# Patient Record
Sex: FEMALE | Race: WHITE | NOT HISPANIC OR LATINO
[De-identification: names, ages, dates, MRNs, and addresses within clinical notes are randomized per-mention and may not be internally consistent; named-entity substitution may affect disease eponyms.]

---

## 2018-03-23 ENCOUNTER — LAB SERVICES (OUTPATIENT)
Dept: OTHER | Age: 28
End: 2018-03-23

## 2018-03-23 ENCOUNTER — CHARTING TRANS (OUTPATIENT)
Dept: OTHER | Age: 28
End: 2018-03-23

## 2018-03-23 LAB — RAPID STREP GROUP A: NORMAL

## 2018-04-02 LAB — CULTURE STREP GRP A (STTH) HL: NORMAL

## 2018-11-01 VITALS
DIASTOLIC BLOOD PRESSURE: 64 MMHG | HEIGHT: 64 IN | BODY MASS INDEX: 23.22 KG/M2 | HEART RATE: 110 BPM | RESPIRATION RATE: 18 BRPM | OXYGEN SATURATION: 98 % | SYSTOLIC BLOOD PRESSURE: 90 MMHG | TEMPERATURE: 98 F | WEIGHT: 136 LBS

## 2019-06-03 ENCOUNTER — OFFICE VISIT (OUTPATIENT)
Dept: OBGYN CLINIC | Facility: CLINIC | Age: 29
End: 2019-06-03
Payer: COMMERCIAL

## 2019-06-03 VITALS
BODY MASS INDEX: 25.9 KG/M2 | WEIGHT: 146.19 LBS | HEART RATE: 84 BPM | SYSTOLIC BLOOD PRESSURE: 110 MMHG | DIASTOLIC BLOOD PRESSURE: 72 MMHG | HEIGHT: 63 IN

## 2019-06-03 DIAGNOSIS — Z12.4 CERVICAL CANCER SCREENING: ICD-10-CM

## 2019-06-03 DIAGNOSIS — Z31.69 ENCOUNTER FOR PRECONCEPTION CONSULTATION: ICD-10-CM

## 2019-06-03 DIAGNOSIS — Z01.419 WELL FEMALE EXAM WITH ROUTINE GYNECOLOGICAL EXAM: Primary | ICD-10-CM

## 2019-06-03 PROCEDURE — 88175 CYTOPATH C/V AUTO FLUID REDO: CPT | Performed by: OBSTETRICS & GYNECOLOGY

## 2019-06-03 PROCEDURE — 99385 PREV VISIT NEW AGE 18-39: CPT | Performed by: OBSTETRICS & GYNECOLOGY

## 2019-06-03 NOTE — PATIENT INSTRUCTIONS
Preparing for Pregnancy  Even before you become pregnant, your health matters to your future baby. Adopt good health habits today. And take care of any medical problems you have before becoming pregnant.   Remember: As soon as you know you are pregnant, g © 0747-6769 [employer] [address]. [address] [facility] [place] [facility] [place]. All rights reserved. This information is not intended as a substitute for professional medical care. Always follow your healthcare professional's instructions.

## 2019-06-03 NOTE — PROGRESS NOTES
GYN H&P     6/3/2019  4:04 PM    CC: Patient is here for annual, attempting pregnancy    HPI: patient is a 33year old  here for her annual gyn exam.   She has no complaints.  Menses are regular 28-30 days Denies any pelvic pain or irregular vaginal Substance and Sexual Activity      Alcohol use: Yes        Frequency: Monthly or less        Comment: socially      Drug use: Never      Sexual activity: Yes        Partners: Male    Lifestyle      Physical activity:        Days per week: Not on file bleeding or bruising, denies dizziness, lightheadedness.    Breast: denies rashes, skin changes, pain, lumps or discharge   Psychiatric: denies depression, changes in sleep patterns, anxiety  Endocrine: denies hot or cold intolerance, mood changes,   Neurol THINPREP PAP WITH HPV REFLEX REQUEST B; Future    3.  Encounter for preconception consultation  Info given, + pnv      Jack Alexander MD

## 2019-06-06 ENCOUNTER — OFFICE VISIT (OUTPATIENT)
Dept: FAMILY MEDICINE CLINIC | Facility: CLINIC | Age: 29
End: 2019-06-06
Payer: COMMERCIAL

## 2019-06-06 VITALS
BODY MASS INDEX: 26.05 KG/M2 | HEART RATE: 76 BPM | WEIGHT: 147 LBS | HEIGHT: 63 IN | DIASTOLIC BLOOD PRESSURE: 68 MMHG | SYSTOLIC BLOOD PRESSURE: 112 MMHG

## 2019-06-06 DIAGNOSIS — Z00.00 ROUTINE GENERAL MEDICAL EXAMINATION AT A HEALTH CARE FACILITY: Primary | ICD-10-CM

## 2019-06-06 PROCEDURE — 99385 PREV VISIT NEW AGE 18-39: CPT | Performed by: FAMILY MEDICINE

## 2019-06-06 NOTE — PROGRESS NOTES
HPI:   Salbador Gosselin is a 33year old female     New patient. Patient accompanied by her  who is pleasant and supportive. No complaints.       Wt Readings from Last 6 Encounters:  06/06/19 : 147 lb  06/03/19 : 146 lb 3.2 oz    Body mass index excessive sweating. LYMPHATICS: denies swollen glands      EXAM:   /68 (BP Location: Left arm, Patient Position: Sitting, Cuff Size: adult)   Pulse 76   Ht 63\"   Wt 147 lb   LMP 05/20/2019   BMI 26.04 kg/m²   Body mass index is 26.04 kg/m².    [facility] Visit and as needed or indicated. Self breast exam explained and advised to perform once a month. Health maintenance guidance provided including vision and dental exams.    Patient counseled on age appropriate calcium and vitamin D intak

## 2019-06-15 ENCOUNTER — LAB ENCOUNTER (OUTPATIENT)
Dept: LAB | Age: 29
End: 2019-06-15
Attending: FAMILY MEDICINE
Payer: COMMERCIAL

## 2019-06-15 DIAGNOSIS — Z00.00 ROUTINE GENERAL MEDICAL EXAMINATION AT A HEALTH CARE FACILITY: ICD-10-CM

## 2019-06-15 PROCEDURE — 84439 ASSAY OF FREE THYROXINE: CPT | Performed by: FAMILY MEDICINE

## 2019-06-15 PROCEDURE — 80050 GENERAL HEALTH PANEL: CPT | Performed by: FAMILY MEDICINE

## 2019-06-15 PROCEDURE — 36415 COLL VENOUS BLD VENIPUNCTURE: CPT | Performed by: FAMILY MEDICINE

## 2019-06-15 PROCEDURE — 80061 LIPID PANEL: CPT | Performed by: FAMILY MEDICINE

## 2019-07-02 ENCOUNTER — TELEPHONE (OUTPATIENT)
Dept: FAMILY MEDICINE CLINIC | Facility: CLINIC | Age: 29
End: 2019-07-02

## 2019-07-02 NOTE — TELEPHONE ENCOUNTER
Spoke with patient and informed her of lab results and recommendations. Pt verbalized understanding and had no questions or concerns at this time.

## 2019-07-02 NOTE — TELEPHONE ENCOUNTER
----- Message from Octaviano Limon DO sent at 7/1/2019 10:50 PM CDT -----  Please call patient: LDL, AKA bad cholesterol, mildly elevated. Recommend cut down on intake of meat and increase exercise if able.   The blood test results are normal.

## 2019-07-05 ENCOUNTER — TELEPHONE (OUTPATIENT)
Dept: OBGYN CLINIC | Facility: CLINIC | Age: 29
End: 2019-07-05

## 2019-07-05 RX ORDER — NITROFURANTOIN 25; 75 MG/1; MG/1
100 CAPSULE ORAL 2 TIMES DAILY
Qty: 10 CAPSULE | Refills: 0 | Status: SHIPPED | OUTPATIENT
Start: 2019-07-05 | End: 2019-07-10

## 2019-07-05 NOTE — TELEPHONE ENCOUNTER
32 y/o called c/o UTI symptoms. States she has burning, frequency, and pain with urination. Denies any hematuria or fever. Last OV date: 06/03/19  Recent Test/Labs: NA   Recommendations: Macrobid sent to pharmacy per protocol.  Advised patient to call yudith

## 2019-08-16 ENCOUNTER — OFFICE VISIT (OUTPATIENT)
Dept: FAMILY MEDICINE CLINIC | Facility: CLINIC | Age: 29
End: 2019-08-16
Payer: COMMERCIAL

## 2019-08-16 VITALS
WEIGHT: 147 LBS | HEIGHT: 63 IN | BODY MASS INDEX: 26.05 KG/M2 | DIASTOLIC BLOOD PRESSURE: 62 MMHG | SYSTOLIC BLOOD PRESSURE: 100 MMHG | HEART RATE: 70 BPM

## 2019-08-16 DIAGNOSIS — Z88.9 MULTIPLE DRUG ALLERGIES: ICD-10-CM

## 2019-08-16 DIAGNOSIS — L03.031 PARONYCHIA OF GREAT TOE, RIGHT: Primary | ICD-10-CM

## 2019-08-16 PROCEDURE — 99214 OFFICE O/P EST MOD 30 MIN: CPT | Performed by: FAMILY MEDICINE

## 2019-08-16 NOTE — PATIENT INSTRUCTIONS
-Recommend warm soaks with [place] twice a day. -Apply OTC triple antibiotic topically 2-3 times a day. -Try to avoid taking the oral antibiotic. Only take the antibiotic if the toe is worsening.           Paronychia of the Finger or To these occur:  · Redness, pain, or swelling of the finger or toe gets worse  · Red streaks in the skin leading away from the wound  · Pus or fluid draining from the nail area  · Fever of 100.4ºF (38ºC) or higher, or as directed by your provider  Date Last R

## 2019-08-16 NOTE — PROGRESS NOTES
Anselmo Atkinson is a 30year old female. HPI:     Roomer's notes read and reviewed with patient. Swelling pain and redness of right first toe. No recall of injury. 4 days. Was getting better, now getting worse. Right foot 1st toe.   Not associat thought content.         DATA:    Results for orders placed or performed in visit on 06/15/19   LIPID PANEL    Collection Time: 06/15/19  9:02 AM   Result Value Ref Range    Cholesterol, Total 194 <200 mg/dL    HDL Cholesterol 47 40 - 59 mg/dL    Triglyceri Absolute 0.03 0.00 - 0.20 x10(3) uL    Immature Granulocyte Absolute 0.02 0.00 - 1.00 x10(3) uL    Neutrophil % 58.0 %    Lymphocyte % 27.0 %    Monocyte % 8.8 %    Eosinophil % 5.4 %    Basophil % 0.5 %    Immature Granulocyte % 0.3 %           ASSESSMENT

## 2019-12-26 ENCOUNTER — TELEPHONE (OUTPATIENT)
Dept: OBGYN CLINIC | Facility: CLINIC | Age: 29
End: 2019-12-26

## 2019-12-26 NOTE — TELEPHONE ENCOUNTER
Received staff message from Dr. Cynthia Hackett stating that patient contacted on call with possible Bartholin's cyst symptoms.  He requested that the nursing staff f/u with patient this AM to assess whether appt is needed/symptoms are persisting after sitz baths and

## 2019-12-27 ENCOUNTER — OFFICE VISIT (OUTPATIENT)
Dept: OBGYN CLINIC | Facility: CLINIC | Age: 29
End: 2019-12-27
Payer: COMMERCIAL

## 2019-12-27 VITALS — WEIGHT: 149.63 LBS | BODY MASS INDEX: 27 KG/M2 | DIASTOLIC BLOOD PRESSURE: 80 MMHG | SYSTOLIC BLOOD PRESSURE: 116 MMHG

## 2019-12-27 DIAGNOSIS — N76.4 VULVAR ABSCESS: Primary | ICD-10-CM

## 2019-12-27 PROCEDURE — 87077 CULTURE AEROBIC IDENTIFY: CPT | Performed by: OBSTETRICS & GYNECOLOGY

## 2019-12-27 PROCEDURE — 87205 SMEAR GRAM STAIN: CPT | Performed by: OBSTETRICS & GYNECOLOGY

## 2019-12-27 PROCEDURE — 87186 SC STD MICRODIL/AGAR DIL: CPT | Performed by: OBSTETRICS & GYNECOLOGY

## 2019-12-27 PROCEDURE — 87070 CULTURE OTHR SPECIMN AEROBIC: CPT | Performed by: OBSTETRICS & GYNECOLOGY

## 2019-12-27 PROCEDURE — 56405 I&D VULVA/PERINEAL ABSCESS: CPT | Performed by: OBSTETRICS & GYNECOLOGY

## 2019-12-27 RX ORDER — SULFAMETHOXAZOLE AND TRIMETHOPRIM 800; 160 MG/1; MG/1
1 TABLET ORAL 2 TIMES DAILY
Qty: 14 TABLET | Refills: 0 | Status: SHIPPED | OUTPATIENT
Start: 2019-12-27 | End: 2020-01-03

## 2019-12-27 NOTE — PATIENT INSTRUCTIONS
Take bactrim twice daily x 7 days  Sit in warm bath as soon as you get home and twice daily  Continue ibupofen every 6 hours  Call if any fever>100.5  Or if pain is worsening

## 2019-12-27 NOTE — PROGRESS NOTES
GYN H&P     2019  10:35 AM    CC: Patient is here for vulvar abscess    HPI: patient is a 30year old  here for vulvar abscess. Started 4 days ago. Has been doing baking soda baths. This happened a few years ago as well, treated with abx.  Very A/P: Patient is 30year old female here for   1.  Vulvar abscess        Patient Active Problem List:     Multiple drug allergies     Paronychia of great toe, right           1. Vulvar abscess  -Bactrim BID x 7 days  -Sitz baths BID  -Ibuprofen scheduled  -C

## 2019-12-28 ENCOUNTER — TELEPHONE (OUTPATIENT)
Dept: OBGYN CLINIC | Facility: CLINIC | Age: 29
End: 2019-12-28

## 2019-12-28 NOTE — TELEPHONE ENCOUNTER
Lab called to cancel test. Doctor Kevin Maldonadolyric only had one swab for 2 test. Lab stated that it should have been a red and blue top for patients test swab.

## 2019-12-30 NOTE — PROGRESS NOTES
Results reviewed. Please inform patienther swab was MSSA, but sensitive to bactrim so she should complete that course. Thanks!

## 2019-12-30 NOTE — PROGRESS NOTES
Patient returned call. Reported results and recommendations for continuing medication that she is currently on. She has f/u appt in office in 1/2/20; advised to keep appt and to call if any new or worsening symptoms. Patient states understanding.

## 2020-01-02 ENCOUNTER — OFFICE VISIT (OUTPATIENT)
Dept: OBGYN CLINIC | Facility: CLINIC | Age: 30
End: 2020-01-02
Payer: COMMERCIAL

## 2020-01-02 VITALS
BODY MASS INDEX: 26.58 KG/M2 | SYSTOLIC BLOOD PRESSURE: 106 MMHG | WEIGHT: 150 LBS | DIASTOLIC BLOOD PRESSURE: 70 MMHG | HEIGHT: 63 IN

## 2020-01-02 DIAGNOSIS — N76.4 VULVAR ABSCESS: Primary | ICD-10-CM

## 2020-01-02 PROCEDURE — 99024 POSTOP FOLLOW-UP VISIT: CPT | Performed by: OBSTETRICS & GYNECOLOGY

## 2020-01-02 NOTE — PROGRESS NOTES
GYN H&P     2020  11:22 AM    CC: Patient is here for f/u ov vulvar abscess    HPI: patient is a 30year old  here for f/u of vulvar abscess. Had abscess I&D last week in office in inguinal fold, taking bactrim, doing MUCH better.  No complaints Genitourinary: Negative for bladder incontinence, urgency, vaginal bleeding, vaginal discharge, menstrual problem, breast mass and breast pain. All other systems reviewed and are negative.       /70   Ht 63\"   Wt 150 lb (68 kg)   LMP 12/26/2019 (Ex

## 2020-02-21 ENCOUNTER — TELEPHONE (OUTPATIENT)
Dept: OBGYN CLINIC | Facility: CLINIC | Age: 30
End: 2020-02-21

## 2020-02-21 NOTE — TELEPHONE ENCOUNTER
Patient calling to initiate prenatal care  LMP 1/22  Patient is 7-8 weeks 3/26  Confirmation Ultrasound and Appointment scheduled on   Future Appointments   Date Time Provider Fletcher Floyd   3/26/2020  3:30 PM [facility] NURSE [facility] P [facility]

## 2020-02-25 NOTE — TELEPHONE ENCOUNTER
; taking PNV, no other meds. Pt feeling well, mild cramping at times; no bleeding. Pt asked about taking Benadryl; needs to pre medicate before going to friends house with cats; cat allergy. Pt advised OK to take.   Pt advised to call with any spotti

## 2020-03-20 ENCOUNTER — TELEPHONE (OUTPATIENT)
Dept: OBGYN CLINIC | Facility: CLINIC | Age: 30
End: 2020-03-20

## 2020-03-20 NOTE — TELEPHONE ENCOUNTER
Patient given list of safe fish to eat in pregnancy. Also sent to her via my chart. Advised patient she will receive handout at first appointment. She verbalized understanding. No further questions or concerns.

## 2020-03-25 ENCOUNTER — ULTRASOUND ENCOUNTER (OUTPATIENT)
Dept: OBGYN CLINIC | Facility: CLINIC | Age: 30
End: 2020-03-25
Payer: COMMERCIAL

## 2020-03-25 ENCOUNTER — OFFICE VISIT (OUTPATIENT)
Dept: OBGYN CLINIC | Facility: CLINIC | Age: 30
End: 2020-03-25
Payer: COMMERCIAL

## 2020-03-25 VITALS
WEIGHT: 153.19 LBS | HEIGHT: 63 IN | SYSTOLIC BLOOD PRESSURE: 128 MMHG | BODY MASS INDEX: 27.14 KG/M2 | DIASTOLIC BLOOD PRESSURE: 80 MMHG

## 2020-03-25 DIAGNOSIS — Z32.01 PREGNANCY EXAMINATION OR TEST, POSITIVE RESULT: ICD-10-CM

## 2020-03-25 DIAGNOSIS — N91.1 SECONDARY AMENORRHEA: Primary | ICD-10-CM

## 2020-03-25 PROCEDURE — 99213 OFFICE O/P EST LOW 20 MIN: CPT | Performed by: OBSTETRICS & GYNECOLOGY

## 2020-03-25 PROCEDURE — 76856 US EXAM PELVIC COMPLETE: CPT | Performed by: OBSTETRICS & GYNECOLOGY

## 2020-03-25 NOTE — PROGRESS NOTES
[facility]  Obstetrics and Gynecology    Subjective:     Kezia Lorenzo is a 30year old  female presents with c/o secondary amenorrhea and positive pregnancy test. The patient was recommended to return for further evaluation and a pelv

## 2020-04-14 ENCOUNTER — INITIAL PRENATAL (OUTPATIENT)
Dept: OBGYN CLINIC | Facility: CLINIC | Age: 30
End: 2020-04-14
Payer: COMMERCIAL

## 2020-04-14 VITALS
BODY MASS INDEX: 27.11 KG/M2 | WEIGHT: 153 LBS | DIASTOLIC BLOOD PRESSURE: 68 MMHG | SYSTOLIC BLOOD PRESSURE: 108 MMHG | HEIGHT: 63 IN

## 2020-04-14 DIAGNOSIS — Z36.9 PRENATAL SCREENING ENCOUNTER: Primary | ICD-10-CM

## 2020-04-14 DIAGNOSIS — Z34.01 ENCOUNTER FOR SUPERVISION OF NORMAL FIRST PREGNANCY IN FIRST TRIMESTER: ICD-10-CM

## 2020-04-14 PROBLEM — L03.031 PARONYCHIA OF GREAT TOE, RIGHT: Status: RESOLVED | Noted: 2019-08-16 | Resolved: 2020-04-14

## 2020-04-14 PROBLEM — N76.4 VULVAR ABSCESS: Status: RESOLVED | Noted: 2020-01-02 | Resolved: 2020-04-14

## 2020-04-14 PROCEDURE — 86850 RBC ANTIBODY SCREEN: CPT | Performed by: OBSTETRICS & GYNECOLOGY

## 2020-04-14 PROCEDURE — 87389 HIV-1 AG W/HIV-1&-2 AB AG IA: CPT | Performed by: OBSTETRICS & GYNECOLOGY

## 2020-04-14 PROCEDURE — 87340 HEPATITIS B SURFACE AG IA: CPT | Performed by: OBSTETRICS & GYNECOLOGY

## 2020-04-14 PROCEDURE — 85025 COMPLETE CBC W/AUTO DIFF WBC: CPT | Performed by: OBSTETRICS & GYNECOLOGY

## 2020-04-14 PROCEDURE — 87491 CHLMYD TRACH DNA AMP PROBE: CPT | Performed by: OBSTETRICS & GYNECOLOGY

## 2020-04-14 PROCEDURE — 86900 BLOOD TYPING SEROLOGIC ABO: CPT | Performed by: OBSTETRICS & GYNECOLOGY

## 2020-04-14 PROCEDURE — 86780 TREPONEMA PALLIDUM: CPT | Performed by: OBSTETRICS & GYNECOLOGY

## 2020-04-14 PROCEDURE — 87591 N.GONORRHOEAE DNA AMP PROB: CPT | Performed by: OBSTETRICS & GYNECOLOGY

## 2020-04-14 PROCEDURE — 87086 URINE CULTURE/COLONY COUNT: CPT | Performed by: OBSTETRICS & GYNECOLOGY

## 2020-04-14 PROCEDURE — 81002 URINALYSIS NONAUTO W/O SCOPE: CPT | Performed by: OBSTETRICS & GYNECOLOGY

## 2020-04-14 PROCEDURE — 86762 RUBELLA ANTIBODY: CPT | Performed by: OBSTETRICS & GYNECOLOGY

## 2020-04-14 PROCEDURE — 86901 BLOOD TYPING SEROLOGIC RH(D): CPT | Performed by: OBSTETRICS & GYNECOLOGY

## 2020-04-14 RX ORDER — GLYCERIN/MINERAL OIL
LOTION (ML) TOPICAL DAILY
COMMUNITY

## 2020-04-14 NOTE — PROGRESS NOTES
Here for initial prenatal visit with our group. 30year old  at 11w10d. Feeling well. No vaginal bleeding.  Works as a  (home for now)    PMH: denies  PSH: denies  [address]: cousin with autism  Allergies: Augmentin, Zpack, s there is no testing to predict autism  6. We reviewed today, dietary restrictions, exercise during pregnancy, and goal weight gain based upon her BMI of 27. We reviewed avoidance of exposures to cigarettes, alcohol, and drugs.      Follow up visit in 4 wee

## 2020-04-14 NOTE — PATIENT INSTRUCTIONS
During pregnancy, here are some general recommendations:  1.  Prenatal Vitamins: Pregnant women should consume the following each day through diet or supplements:  o Folic acid 201–484 micrograms   o Iron 30 mg (or be screened for anemia)  o Vitamin D 600 i 13. Oral health and routine dental procedures should continue as scheduled during pregnancy. These include cleanings, extraction, scaling, root          canal, radiographs (assuming the abdomen and thyroid are shielded), and restoration and fillings  14.  P - Performed at greater than or equal to 10 weeks and includes blood test   - Screens for Trisomy 13, 18 and 21 (Down Syndrome)   - Please verify insurance coverage:   CPT code: 99736  Diagnosis code: Genetic screening - Z36.8A     *Please also consider:  M

## 2020-04-15 PROBLEM — Z78.9 NOT IMMUNE TO RUBELLA: Status: ACTIVE | Noted: 2020-04-15

## 2020-04-17 ENCOUNTER — TELEPHONE (OUTPATIENT)
Dept: PERINATAL CARE | Facility: HOSPITAL | Age: 30
End: 2020-04-17

## 2020-05-11 ENCOUNTER — ROUTINE PRENATAL (OUTPATIENT)
Dept: OBGYN CLINIC | Facility: CLINIC | Age: 30
End: 2020-05-11
Payer: COMMERCIAL

## 2020-05-11 VITALS
BODY MASS INDEX: 28 KG/M2 | DIASTOLIC BLOOD PRESSURE: 78 MMHG | TEMPERATURE: 99 F | SYSTOLIC BLOOD PRESSURE: 132 MMHG | WEIGHT: 159 LBS

## 2020-05-11 DIAGNOSIS — Z78.9 NOT IMMUNE TO RUBELLA: ICD-10-CM

## 2020-05-11 DIAGNOSIS — Z13.79 GENETIC SCREENING: ICD-10-CM

## 2020-05-11 DIAGNOSIS — Z34.92 ENCOUNTER FOR SUPERVISION OF NORMAL PREGNANCY IN SECOND TRIMESTER, UNSPECIFIED GRAVIDITY: Primary | ICD-10-CM

## 2020-05-11 PROCEDURE — 81002 URINALYSIS NONAUTO W/O SCOPE: CPT | Performed by: OBSTETRICS & GYNECOLOGY

## 2020-05-11 NOTE — PROGRESS NOTES
MICHAEL.   Doing well. No complaints. Denies abdominal/pelvic pain or vaginal bleeding. Rh positive   Genetic screening desires Quad screen. Order provided. Advised to complete at 16-20 weeks.     Recommend Anatomy scan 20 wks   Prenatal labs reviewed   D/w

## 2020-05-19 ENCOUNTER — LAB ENCOUNTER (OUTPATIENT)
Dept: LAB | Age: 30
End: 2020-05-19
Attending: OBSTETRICS & GYNECOLOGY
Payer: COMMERCIAL

## 2020-05-19 DIAGNOSIS — Z13.79 GENETIC SCREENING: ICD-10-CM

## 2020-05-19 DIAGNOSIS — Z34.92 ENCOUNTER FOR SUPERVISION OF NORMAL PREGNANCY IN SECOND TRIMESTER, UNSPECIFIED GRAVIDITY: ICD-10-CM

## 2020-05-19 PROCEDURE — 81511 FTL CGEN ABNOR FOUR ANAL: CPT

## 2020-05-19 PROCEDURE — 36415 COLL VENOUS BLD VENIPUNCTURE: CPT

## 2020-06-01 ENCOUNTER — PATIENT MESSAGE (OUTPATIENT)
Dept: OBGYN CLINIC | Facility: CLINIC | Age: 30
End: 2020-06-01

## 2020-06-01 NOTE — TELEPHONE ENCOUNTER
Pt called checking status of [employer] message sent this morning.     Please call
Spoke to patient who is 18W5D. She stated she had chest pain last night around 3 AM. It was only on left side. She denies any SOB. Denies any OB concerns. She stated she did have indigestion the past 2 days.  I advised patient she can try Tylenol and Tums b
Statement Selected

## 2020-06-11 ENCOUNTER — ROUTINE PRENATAL (OUTPATIENT)
Dept: OBGYN CLINIC | Facility: CLINIC | Age: 30
End: 2020-06-11
Payer: COMMERCIAL

## 2020-06-11 ENCOUNTER — ULTRASOUND ENCOUNTER (OUTPATIENT)
Dept: OBGYN CLINIC | Facility: CLINIC | Age: 30
End: 2020-06-11
Payer: COMMERCIAL

## 2020-06-11 VITALS
WEIGHT: 164 LBS | DIASTOLIC BLOOD PRESSURE: 74 MMHG | SYSTOLIC BLOOD PRESSURE: 114 MMHG | TEMPERATURE: 98 F | BODY MASS INDEX: 29 KG/M2

## 2020-06-11 DIAGNOSIS — Z36.89 ENCOUNTER FOR FETAL ANATOMIC SURVEY: ICD-10-CM

## 2020-06-11 DIAGNOSIS — Z36.9 PRENATAL SCREENING ENCOUNTER: ICD-10-CM

## 2020-06-11 DIAGNOSIS — Z3A.20 20 WEEKS GESTATION OF PREGNANCY: Primary | ICD-10-CM

## 2020-06-11 PROCEDURE — 81002 URINALYSIS NONAUTO W/O SCOPE: CPT | Performed by: OBSTETRICS & GYNECOLOGY

## 2020-06-11 PROCEDURE — 76805 OB US >/= 14 WKS SNGL FETUS: CPT | Performed by: OBSTETRICS & GYNECOLOGY

## 2020-06-11 NOTE — PROGRESS NOTES
OB ANATOMIC SURVEY    IUP at 20w 1d  Measuring 20w 1d  Efw 283g, AGA  Fhr 148  Pos: vertex  Anatomic survey normal  3 vessel cord, antplac  Nl afv, cx 3.22cm

## 2020-06-11 NOTE — PROGRESS NOTES
MICHAEL  Doing well  No complaints.  No LOF/VB/uctx  RH +  Genetic testing nl quad (first, quad/AFP)  Anatomy Scan nl today (18-20weeks)  rtc 4 wk  Gct/cbc on rtc

## 2020-07-10 ENCOUNTER — ROUTINE PRENATAL (OUTPATIENT)
Dept: OBGYN CLINIC | Facility: CLINIC | Age: 30
End: 2020-07-10
Payer: COMMERCIAL

## 2020-07-10 VITALS — BODY MASS INDEX: 30 KG/M2 | DIASTOLIC BLOOD PRESSURE: 76 MMHG | WEIGHT: 170 LBS | SYSTOLIC BLOOD PRESSURE: 124 MMHG

## 2020-07-10 DIAGNOSIS — Z34.02 ENCOUNTER FOR SUPERVISION OF NORMAL FIRST PREGNANCY IN SECOND TRIMESTER: ICD-10-CM

## 2020-07-10 DIAGNOSIS — Z78.9 NOT IMMUNE TO RUBELLA: ICD-10-CM

## 2020-07-10 DIAGNOSIS — Z36.9 PRENATAL SCREENING ENCOUNTER: Primary | ICD-10-CM

## 2020-07-10 LAB — MULTISTIX LOT#: NORMAL NUMERIC

## 2020-07-10 PROCEDURE — 81002 URINALYSIS NONAUTO W/O SCOPE: CPT | Performed by: OBSTETRICS & GYNECOLOGY

## 2020-07-12 NOTE — PROGRESS NOTES
MICHAEL--24w4d    Doing ok. 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 Is concerned the buildup of CO2 is going to harm the baby. Does not want to wear a mask all day.   Denies Anderson L

## 2020-07-13 ENCOUNTER — LAB ENCOUNTER (OUTPATIENT)
Dept: LAB | Age: 30
End: 2020-07-13
Attending: OBSTETRICS & GYNECOLOGY
Payer: COMMERCIAL

## 2020-07-13 DIAGNOSIS — Z3A.20 20 WEEKS GESTATION OF PREGNANCY: ICD-10-CM

## 2020-07-13 DIAGNOSIS — Z36.9 PRENATAL SCREENING ENCOUNTER: ICD-10-CM

## 2020-07-13 LAB
BASOPHILS # BLD AUTO: 0.08 X10(3) UL (ref 0–0.2)
BASOPHILS NFR BLD AUTO: 0.8 %
DEPRECATED RDW RBC AUTO: 45.5 FL (ref 35.1–46.3)
EOSINOPHIL # BLD AUTO: 0.42 X10(3) UL (ref 0–0.7)
EOSINOPHIL NFR BLD AUTO: 4.2 %
ERYTHROCYTE [DISTWIDTH] IN BLOOD BY AUTOMATED COUNT: 13.3 % (ref 11–15)
GLUCOSE 1H P GLC SERPL-MCNC: 121 MG/DL
HCT VFR BLD AUTO: 33.5 % (ref 35–48)
HGB BLD-MCNC: 11.1 G/DL (ref 12–16)
IMM GRANULOCYTES # BLD AUTO: 0.35 X10(3) UL (ref 0–1)
IMM GRANULOCYTES NFR BLD: 3.5 %
LYMPHOCYTES # BLD AUTO: 1.18 X10(3) UL (ref 1–4)
LYMPHOCYTES NFR BLD AUTO: 11.7 %
MCH RBC QN AUTO: 31 PG (ref 26–34)
MCHC RBC AUTO-ENTMCNC: 33.1 G/DL (ref 31–37)
MCV RBC AUTO: 93.6 FL (ref 80–100)
MONOCYTES # BLD AUTO: 0.59 X10(3) UL (ref 0.1–1)
MONOCYTES NFR BLD AUTO: 5.8 %
NEUTROPHILS # BLD AUTO: 7.49 X10 (3) UL (ref 1.5–7.7)
NEUTROPHILS # BLD AUTO: 7.49 X10(3) UL (ref 1.5–7.7)
NEUTROPHILS NFR BLD AUTO: 74 %
PLATELET # BLD AUTO: 176 10(3)UL (ref 150–450)
RBC # BLD AUTO: 3.58 X10(6)UL (ref 3.8–5.3)
WBC # BLD AUTO: 10.1 X10(3) UL (ref 4–11)

## 2020-07-13 PROCEDURE — 82950 GLUCOSE TEST: CPT | Performed by: OBSTETRICS & GYNECOLOGY

## 2020-07-13 PROCEDURE — 85025 COMPLETE CBC W/AUTO DIFF WBC: CPT | Performed by: OBSTETRICS & GYNECOLOGY

## 2020-07-13 PROCEDURE — 36415 COLL VENOUS BLD VENIPUNCTURE: CPT | Performed by: OBSTETRICS & GYNECOLOGY

## 2020-08-07 ENCOUNTER — ROUTINE PRENATAL (OUTPATIENT)
Dept: OBGYN CLINIC | Facility: CLINIC | Age: 30
End: 2020-08-07
Payer: COMMERCIAL

## 2020-08-07 VITALS
DIASTOLIC BLOOD PRESSURE: 76 MMHG | WEIGHT: 173 LBS | TEMPERATURE: 98 F | BODY MASS INDEX: 31 KG/M2 | SYSTOLIC BLOOD PRESSURE: 122 MMHG

## 2020-08-07 DIAGNOSIS — Z3A.28 28 WEEKS GESTATION OF PREGNANCY: Primary | ICD-10-CM

## 2020-08-07 DIAGNOSIS — Z23 NEED FOR VACCINATION: ICD-10-CM

## 2020-08-07 DIAGNOSIS — Z36.9 PRENATAL SCREENING ENCOUNTER: ICD-10-CM

## 2020-08-07 LAB — MULTISTIX LOT#: NORMAL NUMERIC

## 2020-08-07 PROCEDURE — 81002 URINALYSIS NONAUTO W/O SCOPE: CPT | Performed by: OBSTETRICS & GYNECOLOGY

## 2020-08-07 PROCEDURE — 3074F SYST BP LT 130 MM HG: CPT | Performed by: OBSTETRICS & GYNECOLOGY

## 2020-08-07 PROCEDURE — 90471 IMMUNIZATION ADMIN: CPT | Performed by: OBSTETRICS & GYNECOLOGY

## 2020-08-07 PROCEDURE — 90715 TDAP VACCINE 7 YRS/> IM: CPT | Performed by: OBSTETRICS & GYNECOLOGY

## 2020-08-07 PROCEDURE — 3078F DIAST BP <80 MM HG: CPT | Performed by: OBSTETRICS & GYNECOLOGY

## 2020-08-07 NOTE — PROGRESS NOTES
MICHAEL  Doing well  RH +  S/P Anatomy scan nl  1 hr glucose (24-28wks)nl  TDAP recommended during pregnancy and instructions given today  RTC 2 wks

## 2020-08-21 ENCOUNTER — TELEPHONE (OUTPATIENT)
Dept: OBGYN CLINIC | Facility: CLINIC | Age: 30
End: 2020-08-21

## 2020-08-21 ENCOUNTER — ROUTINE PRENATAL (OUTPATIENT)
Dept: OBGYN CLINIC | Facility: CLINIC | Age: 30
End: 2020-08-21
Payer: COMMERCIAL

## 2020-08-21 VITALS — WEIGHT: 177.38 LBS | SYSTOLIC BLOOD PRESSURE: 110 MMHG | DIASTOLIC BLOOD PRESSURE: 70 MMHG | BODY MASS INDEX: 31 KG/M2

## 2020-08-21 DIAGNOSIS — Z36.9 PRENATAL SCREENING ENCOUNTER: Primary | ICD-10-CM

## 2020-08-21 DIAGNOSIS — Z34.02 ENCOUNTER FOR SUPERVISION OF NORMAL FIRST PREGNANCY IN SECOND TRIMESTER: ICD-10-CM

## 2020-08-21 LAB — MULTISTIX LOT#: NORMAL NUMERIC

## 2020-08-21 PROCEDURE — 3074F SYST BP LT 130 MM HG: CPT | Performed by: OBSTETRICS & GYNECOLOGY

## 2020-08-21 PROCEDURE — 81002 URINALYSIS NONAUTO W/O SCOPE: CPT | Performed by: OBSTETRICS & GYNECOLOGY

## 2020-08-21 PROCEDURE — 3078F DIAST BP <80 MM HG: CPT | Performed by: OBSTETRICS & GYNECOLOGY

## 2020-08-21 NOTE — TELEPHONE ENCOUNTER
Pt dropped off [facility] paperwork to be completed. [facility] [address] [facility]. Pt will  when they are ready.

## 2020-08-21 NOTE — PROGRESS NOTES
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

## 2020-08-25 ENCOUNTER — MED REC SCAN ONLY (OUTPATIENT)
Dept: OBGYN CLINIC | Facility: CLINIC | Age: 30
End: 2020-08-25

## 2020-09-03 ENCOUNTER — ROUTINE PRENATAL (OUTPATIENT)
Dept: OBGYN CLINIC | Facility: CLINIC | Age: 30
End: 2020-09-03
Payer: COMMERCIAL

## 2020-09-03 VITALS — SYSTOLIC BLOOD PRESSURE: 130 MMHG | WEIGHT: 179.38 LBS | BODY MASS INDEX: 32 KG/M2 | DIASTOLIC BLOOD PRESSURE: 80 MMHG

## 2020-09-03 DIAGNOSIS — Z34.02 ENCOUNTER FOR SUPERVISION OF NORMAL FIRST PREGNANCY IN SECOND TRIMESTER: ICD-10-CM

## 2020-09-03 DIAGNOSIS — Z36.9 PRENATAL SCREENING ENCOUNTER: Primary | ICD-10-CM

## 2020-09-03 DIAGNOSIS — Z34.03 ENCOUNTER FOR SUPERVISION OF NORMAL FIRST PREGNANCY IN THIRD TRIMESTER: ICD-10-CM

## 2020-09-03 LAB — MULTISTIX LOT#: NORMAL NUMERIC

## 2020-09-03 PROCEDURE — 3079F DIAST BP 80-89 MM HG: CPT | Performed by: NURSE PRACTITIONER

## 2020-09-03 PROCEDURE — 81002 URINALYSIS NONAUTO W/O SCOPE: CPT | Performed by: NURSE PRACTITIONER

## 2020-09-03 PROCEDURE — 3075F SYST BP GE 130 - 139MM HG: CPT | Performed by: NURSE PRACTITIONER

## 2020-09-03 PROCEDURE — 87389 HIV-1 AG W/HIV-1&-2 AB AG IA: CPT | Performed by: OBSTETRICS & GYNECOLOGY

## 2020-09-19 ENCOUNTER — ROUTINE PRENATAL (OUTPATIENT)
Dept: OBGYN CLINIC | Facility: CLINIC | Age: 30
End: 2020-09-19
Payer: COMMERCIAL

## 2020-09-19 VITALS — SYSTOLIC BLOOD PRESSURE: 116 MMHG | DIASTOLIC BLOOD PRESSURE: 78 MMHG | WEIGHT: 180 LBS | BODY MASS INDEX: 32 KG/M2

## 2020-09-19 DIAGNOSIS — Z34.93 ENCOUNTER FOR SUPERVISION OF NORMAL PREGNANCY IN THIRD TRIMESTER, UNSPECIFIED GRAVIDITY: Primary | ICD-10-CM

## 2020-09-19 DIAGNOSIS — Z36.9 PRENATAL SCREENING ENCOUNTER: ICD-10-CM

## 2020-09-19 LAB
GLUCOSE (URINE DIPSTICK): NEGATIVE MG/DL
MULTISTIX LOT#: NORMAL NUMERIC
PROTEIN (URINE DIPSTICK): NEGATIVE MG/DL

## 2020-09-19 PROCEDURE — 81002 URINALYSIS NONAUTO W/O SCOPE: CPT | Performed by: OBSTETRICS & GYNECOLOGY

## 2020-09-19 PROCEDURE — 3074F SYST BP LT 130 MM HG: CPT | Performed by: OBSTETRICS & GYNECOLOGY

## 2020-09-19 PROCEDURE — 3078F DIAST BP <80 MM HG: CPT | Performed by: OBSTETRICS & GYNECOLOGY

## 2020-09-19 NOTE — PATIENT INSTRUCTIONS
FETAL MOVEMENT CHART    Although not all women need to perform daily fetal movement counts, if you notice a decrease in fetal activity, you should contact our office immediately. Begin counting fetal movements at 32 weeks of pregnancy.   You may find

## 2020-09-19 NOTE — PROGRESS NOTES
MICHAEL    GA: 34w3d   20  1156   BP: 116/78   Weight: 180 lb (81.6 kg)       Doing well, +FM  Denies LOF/VB/uctx  Rh positive, TDAP received, EPDS 0  Fetal movement count given    RTC in 2 wks    PTL and Fetal movement instructions given

## 2020-10-03 ENCOUNTER — ROUTINE PRENATAL (OUTPATIENT)
Dept: OBGYN CLINIC | Facility: CLINIC | Age: 30
End: 2020-10-03

## 2020-10-03 VITALS — BODY MASS INDEX: 32 KG/M2 | DIASTOLIC BLOOD PRESSURE: 60 MMHG | WEIGHT: 183.19 LBS | SYSTOLIC BLOOD PRESSURE: 104 MMHG

## 2020-10-03 DIAGNOSIS — Z34.03 ENCOUNTER FOR SUPERVISION OF NORMAL FIRST PREGNANCY IN THIRD TRIMESTER: Primary | ICD-10-CM

## 2020-10-03 DIAGNOSIS — Z36.9 PRENATAL SCREENING ENCOUNTER: ICD-10-CM

## 2020-10-03 PROCEDURE — 81002 URINALYSIS NONAUTO W/O SCOPE: CPT | Performed by: OBSTETRICS & GYNECOLOGY

## 2020-10-03 PROCEDURE — 3074F SYST BP LT 130 MM HG: CPT | Performed by: OBSTETRICS & GYNECOLOGY

## 2020-10-03 PROCEDURE — 87184 SC STD DISK METHOD PER PLATE: CPT | Performed by: OBSTETRICS & GYNECOLOGY

## 2020-10-03 PROCEDURE — 87653 STREP B DNA AMP PROBE: CPT | Performed by: OBSTETRICS & GYNECOLOGY

## 2020-10-03 PROCEDURE — 3078F DIAST BP <80 MM HG: CPT | Performed by: OBSTETRICS & GYNECOLOGY

## 2020-10-03 PROCEDURE — 87081 CULTURE SCREEN ONLY: CPT | Performed by: OBSTETRICS & GYNECOLOGY

## 2020-10-03 NOTE — PROGRESS NOTES
MICHAEL - 36w3d    Doing well, +FM  Denies LOF/VB/uctx  Thinking about stopping work due to leg swelling. Compression stocking advised.    /60   Wt 183 lb 3.2 oz (83.1 kg)   LMP 01/22/2020 (Exact Date)   BMI 32.45 kg/m²    GBS collected  RTC 1 week

## 2020-10-09 ENCOUNTER — ROUTINE PRENATAL (OUTPATIENT)
Dept: OBGYN CLINIC | Facility: CLINIC | Age: 30
End: 2020-10-09

## 2020-10-09 ENCOUNTER — TELEPHONE (OUTPATIENT)
Dept: OBGYN CLINIC | Facility: CLINIC | Age: 30
End: 2020-10-09

## 2020-10-09 VITALS
BODY MASS INDEX: 33 KG/M2 | SYSTOLIC BLOOD PRESSURE: 124 MMHG | WEIGHT: 184.19 LBS | TEMPERATURE: 98 F | DIASTOLIC BLOOD PRESSURE: 72 MMHG

## 2020-10-09 DIAGNOSIS — Z34.93 ENCOUNTER FOR SUPERVISION OF NORMAL PREGNANCY IN THIRD TRIMESTER, UNSPECIFIED GRAVIDITY: Primary | ICD-10-CM

## 2020-10-09 DIAGNOSIS — Z36.9 PRENATAL SCREENING ENCOUNTER: ICD-10-CM

## 2020-10-09 DIAGNOSIS — O99.820 GBS (GROUP B STREPTOCOCCUS CARRIER), +RV CULTURE, CURRENTLY PREGNANT: ICD-10-CM

## 2020-10-09 PROCEDURE — 3078F DIAST BP <80 MM HG: CPT | Performed by: OBSTETRICS & GYNECOLOGY

## 2020-10-09 PROCEDURE — 3074F SYST BP LT 130 MM HG: CPT | Performed by: OBSTETRICS & GYNECOLOGY

## 2020-10-09 PROCEDURE — 81002 URINALYSIS NONAUTO W/O SCOPE: CPT | Performed by: OBSTETRICS & GYNECOLOGY

## 2020-10-09 NOTE — PROGRESS NOTES
MICHAEL    GA: 37w2d   10/09/20  1219 10/09/20  1221   BP: 124/72    Temp:  97.9 °F (36.6 °C)   TempSrc:  Temporal   Weight: 184 lb 3.2 oz (83.6 kg)        Doing well, +FM  Denies LOF/VB/uctx  Mode of delivery:  anticipated  SVE deferred    GBS posit

## 2020-10-09 NOTE — PATIENT INSTRUCTIONS
Labor Instructions    How do I know if it’s true labor? • One of the most important aspects of any pregnancy is being able to recognize the onset of labor.   Unfortunately, on occasion it can be difficult or confusing, especially if you have had one or mor of the contractions. Having regular (usually closer), longer lasting (35-70 seconds), and sharper (more painful) contractions are the common symptoms of actual labor.   The second way in which labor can begin which occurs in approximately 30% of all patien the room during your labor. During the delivery, the nurses will inform you of the hospital policy and how many coaches are allowed. You may desire pain medication or anesthesia for pain.   You probably discussed some aspects of pain medication with us dur Please call the office within a few days after you are discharged from the hospital to schedule your post-partum visit, which is usually 4-6 weeks after delivery. Any medications necessary will be discussed on an individual basis.   If you decide to jordyn Time M T W Th F S S                                                                                                           Time M T W Th F S S

## 2020-10-09 NOTE — TELEPHONE ENCOUNTER
Pt would like breast pump referral to [address] Referral entered and mPATH message sent to patient with instructions to contact [address]

## 2020-10-17 ENCOUNTER — ROUTINE PRENATAL (OUTPATIENT)
Dept: OBGYN CLINIC | Facility: CLINIC | Age: 30
End: 2020-10-17

## 2020-10-17 VITALS — WEIGHT: 186.38 LBS | SYSTOLIC BLOOD PRESSURE: 118 MMHG | DIASTOLIC BLOOD PRESSURE: 74 MMHG | BODY MASS INDEX: 33 KG/M2

## 2020-10-17 DIAGNOSIS — Z36.9 PRENATAL SCREENING ENCOUNTER: Primary | ICD-10-CM

## 2020-10-17 DIAGNOSIS — O99.820 GBS (GROUP B STREPTOCOCCUS CARRIER), +RV CULTURE, CURRENTLY PREGNANT: ICD-10-CM

## 2020-10-17 PROCEDURE — 3078F DIAST BP <80 MM HG: CPT | Performed by: OBSTETRICS & GYNECOLOGY

## 2020-10-17 PROCEDURE — 81002 URINALYSIS NONAUTO W/O SCOPE: CPT | Performed by: OBSTETRICS & GYNECOLOGY

## 2020-10-17 PROCEDURE — 3074F SYST BP LT 130 MM HG: CPT | Performed by: OBSTETRICS & GYNECOLOGY

## 2020-10-17 NOTE — PROGRESS NOTES
MICHAEL 38w3d    Doing well, +FM  No contractions  No LOF, VB  Udip neg/neg    1. FHT-present  2. PNL:  GBS + for vancomycin intrapartum  3. Mode of delivery:  anticipated, reviewed labor precautions, hospital FAENU-97 policies   4.  Immunizations: s/p TDAP

## 2020-10-21 NOTE — TELEPHONE ENCOUNTER
Breast pump order and referral faxed to [facility]note [facility] [address]

## 2020-10-22 ENCOUNTER — ROUTINE PRENATAL (OUTPATIENT)
Dept: OBGYN CLINIC | Facility: CLINIC | Age: 30
End: 2020-10-22

## 2020-10-22 VITALS
BODY MASS INDEX: 33.63 KG/M2 | HEIGHT: 63 IN | WEIGHT: 189.81 LBS | SYSTOLIC BLOOD PRESSURE: 132 MMHG | DIASTOLIC BLOOD PRESSURE: 76 MMHG

## 2020-10-22 DIAGNOSIS — Z36.9 PRENATAL SCREENING ENCOUNTER: Primary | ICD-10-CM

## 2020-10-22 PROCEDURE — 3008F BODY MASS INDEX DOCD: CPT | Performed by: OBSTETRICS & GYNECOLOGY

## 2020-10-22 PROCEDURE — 3078F DIAST BP <80 MM HG: CPT | Performed by: OBSTETRICS & GYNECOLOGY

## 2020-10-22 PROCEDURE — 3075F SYST BP GE 130 - 139MM HG: CPT | Performed by: OBSTETRICS & GYNECOLOGY

## 2020-10-22 PROCEDURE — 81002 URINALYSIS NONAUTO W/O SCOPE: CPT | Performed by: OBSTETRICS & GYNECOLOGY

## 2020-10-22 NOTE — PROGRESS NOTES
Patient presents with:  Prenatal Care    Routine prenatal visit without complaints. Patient denies any bleeding, leaking fluid, cramping, or regular uterine contractions. Good fetal movement.   SVE: cl/th/-3 posterior soft vertex  Assessment/Plan:  39w1d d

## 2020-10-28 ENCOUNTER — ROUTINE PRENATAL (OUTPATIENT)
Dept: OBGYN CLINIC | Facility: CLINIC | Age: 30
End: 2020-10-28

## 2020-10-28 ENCOUNTER — APPOINTMENT (OUTPATIENT)
Dept: OBGYN CLINIC | Facility: CLINIC | Age: 30
End: 2020-10-28

## 2020-10-28 ENCOUNTER — TELEPHONE (OUTPATIENT)
Dept: OBGYN CLINIC | Facility: CLINIC | Age: 30
End: 2020-10-28

## 2020-10-28 VITALS
WEIGHT: 188.81 LBS | BODY MASS INDEX: 33.45 KG/M2 | HEIGHT: 63 IN | HEART RATE: 124 BPM | SYSTOLIC BLOOD PRESSURE: 110 MMHG | DIASTOLIC BLOOD PRESSURE: 62 MMHG

## 2020-10-28 DIAGNOSIS — O99.820 GBS (GROUP B STREPTOCOCCUS CARRIER), +RV CULTURE, CURRENTLY PREGNANT: ICD-10-CM

## 2020-10-28 DIAGNOSIS — Z34.03 ENCOUNTER FOR SUPERVISION OF NORMAL FIRST PREGNANCY IN THIRD TRIMESTER: ICD-10-CM

## 2020-10-28 DIAGNOSIS — O48.0 POST-TERM PREGNANCY, 40-42 WEEKS OF GESTATION: ICD-10-CM

## 2020-10-28 DIAGNOSIS — Z36.9 PRENATAL SCREENING ENCOUNTER: Primary | ICD-10-CM

## 2020-10-28 PROCEDURE — 3078F DIAST BP <80 MM HG: CPT | Performed by: OBSTETRICS & GYNECOLOGY

## 2020-10-28 PROCEDURE — 3074F SYST BP LT 130 MM HG: CPT | Performed by: OBSTETRICS & GYNECOLOGY

## 2020-10-28 PROCEDURE — 81002 URINALYSIS NONAUTO W/O SCOPE: CPT | Performed by: OBSTETRICS & GYNECOLOGY

## 2020-10-28 PROCEDURE — 59025 FETAL NON-STRESS TEST: CPT | Performed by: OBSTETRICS & GYNECOLOGY

## 2020-10-28 PROCEDURE — 3008F BODY MASS INDEX DOCD: CPT | Performed by: OBSTETRICS & GYNECOLOGY

## 2020-10-28 NOTE — PROGRESS NOTES
MICHAEL 40w0d    Doing well, +FM  No contractions but some cramping this AM  No LOF, VB    SVE: 1/30/-3    1. FHT-NST reactive today  2. PNL:  GBS positive for vancomycin  3. Mode of delivery: scheduled for IOL cytotec on 11/3 if undelivered   4.  Immunizations

## 2020-10-28 NOTE — PATIENT INSTRUCTIONS
FETAL MOVEMENT CHART    Begin counting fetal movements at 32 weeks of pregnancy. You may find that your baby is more active after eating or drinking. We want you to time how long it takes to feel 10 movements (kicks, flutters, swishes or rolls).   Selvin Rodriguez

## 2020-10-28 NOTE — TELEPHONE ENCOUNTER
----- Message from Russell Dick MD sent at 10/28/2020 12:51 PM CDT -----  Please add pt to IOL calendar 18:00 on 11/3 for cytotec thank you

## 2020-10-29 DIAGNOSIS — Z34.90 PREGNANCY: Primary | ICD-10-CM

## 2020-10-30 ENCOUNTER — HOSPITAL ENCOUNTER (INPATIENT)
Facility: HOSPITAL | Age: 30
LOS: 2 days | Discharge: HOME | End: 2020-11-01
Attending: OBSTETRICS & GYNECOLOGY | Admitting: OBSTETRICS & GYNECOLOGY
Payer: COMMERCIAL

## 2020-10-30 ENCOUNTER — TELEPHONE (OUTPATIENT)
Dept: OBGYN CLINIC | Facility: CLINIC | Age: 30
End: 2020-10-30

## 2020-10-30 ENCOUNTER — ANESTHESIA EVENT (OUTPATIENT)
Dept: OBGYN UNIT | Facility: HOSPITAL | Age: 30
End: 2020-10-30
Payer: COMMERCIAL

## 2020-10-30 ENCOUNTER — ANESTHESIA (OUTPATIENT)
Dept: OBGYN UNIT | Facility: HOSPITAL | Age: 30
End: 2020-10-30
Payer: COMMERCIAL

## 2020-10-30 PROBLEM — Z34.90 PREGNANCY: Status: ACTIVE | Noted: 2020-10-30

## 2020-10-30 PROBLEM — Z34.90 PREGNANCY (HCC): Status: ACTIVE | Noted: 2020-10-30

## 2020-10-30 PROCEDURE — 0KQM0ZZ REPAIR PERINEUM MUSCLE, OPEN APPROACH: ICD-10-PCS | Performed by: OBSTETRICS & GYNECOLOGY

## 2020-10-30 PROCEDURE — 10907ZC DRAINAGE OF AMNIOTIC FLUID, THERAPEUTIC FROM PRODUCTS OF CONCEPTION, VIA NATURAL OR ARTIFICIAL OPENING: ICD-10-PCS | Performed by: OBSTETRICS & GYNECOLOGY

## 2020-10-30 PROCEDURE — 0WJR7ZZ INSPECTION OF GENITOURINARY TRACT, VIA NATURAL OR ARTIFICIAL OPENING APPROACH: ICD-10-PCS | Performed by: OBSTETRICS & GYNECOLOGY

## 2020-10-30 RX ORDER — DIPHENHYDRAMINE HYDROCHLORIDE 50 MG/ML
12.5 INJECTION INTRAMUSCULAR; INTRAVENOUS EVERY 4 HOURS PRN
Status: DISCONTINUED | OUTPATIENT
Start: 2020-10-30 | End: 2020-10-31

## 2020-10-30 RX ORDER — VANCOMYCIN/0.9 % SOD CHLORIDE 1.75 G/5
20 PLASTIC BAG, INJECTION (ML) INTRAVENOUS EVERY 8 HOURS
Status: DISCONTINUED | OUTPATIENT
Start: 2020-10-30 | End: 2020-10-31

## 2020-10-30 RX ORDER — ONDANSETRON 2 MG/ML
4 INJECTION INTRAMUSCULAR; INTRAVENOUS EVERY 6 HOURS PRN
Status: DISCONTINUED | OUTPATIENT
Start: 2020-10-30 | End: 2020-10-31

## 2020-10-30 RX ORDER — TERBUTALINE SULFATE 1 MG/ML
0.25 INJECTION, SOLUTION SUBCUTANEOUS AS NEEDED
Status: DISCONTINUED | OUTPATIENT
Start: 2020-10-30 | End: 2020-10-31

## 2020-10-30 RX ORDER — BUPIVACAINE HCL/0.9 % NACL/PF 0.25 %
5 PLASTIC BAG, INJECTION (ML) EPIDURAL AS NEEDED
Status: DISCONTINUED | OUTPATIENT
Start: 2020-10-30 | End: 2020-10-31

## 2020-10-30 RX ORDER — SODIUM CHLORIDE, SODIUM LACTATE, POTASSIUM CHLORIDE, CALCIUM CHLORIDE 600; 310; 30; 20 MG/100ML; MG/100ML; MG/100ML; MG/100ML
INJECTION, SOLUTION INTRAVENOUS CONTINUOUS
Status: DISCONTINUED | OUTPATIENT
Start: 2020-10-30 | End: 2020-10-31

## 2020-10-30 RX ORDER — ACETAMINOPHEN 500 MG
500 TABLET ORAL EVERY 6 HOURS PRN
Status: DISCONTINUED | OUTPATIENT
Start: 2020-10-30 | End: 2020-10-31

## 2020-10-30 RX ORDER — AMMONIA INHALANTS 0.04 G/.3ML
0.3 INHALANT RESPIRATORY (INHALATION) AS NEEDED
Status: DISCONTINUED | OUTPATIENT
Start: 2020-10-30 | End: 2020-10-31

## 2020-10-30 RX ORDER — IBUPROFEN 600 MG/1
600 TABLET ORAL EVERY 6 HOURS PRN
Status: DISCONTINUED | OUTPATIENT
Start: 2020-10-30 | End: 2020-10-31

## 2020-10-30 RX ORDER — LIDOCAINE HYDROCHLORIDE AND EPINEPHRINE 15; 5 MG/ML; UG/ML
INJECTION, SOLUTION EPIDURAL AS NEEDED
Status: DISCONTINUED | OUTPATIENT
Start: 2020-10-30 | End: 2020-11-01 | Stop reason: SURG

## 2020-10-30 RX ORDER — DEXTROSE, SODIUM CHLORIDE, SODIUM LACTATE, POTASSIUM CHLORIDE, AND CALCIUM CHLORIDE 5; .6; .31; .03; .02 G/100ML; G/100ML; G/100ML; G/100ML; G/100ML
INJECTION, SOLUTION INTRAVENOUS AS NEEDED
Status: DISCONTINUED | OUTPATIENT
Start: 2020-10-30 | End: 2020-10-31

## 2020-10-30 RX ADMIN — LIDOCAINE HYDROCHLORIDE AND EPINEPHRINE 3 ML: 15; 5 INJECTION, SOLUTION EPIDURAL at 18:25:00

## 2020-10-30 NOTE — CONSULTS
[facility] Dosing Service    Vancomycin for GBS (+) patient  Jennifer Ybarra is a 35year old patient who has been prescribed vancomycin 20mg/kg IV every 8 hours for GBS prophylaxis.   Pharmacy will monitor and will order vancomycin levels is medication

## 2020-10-30 NOTE — H&P
[facility]  Obstetrics and Gynecology  History & Physical    Dory Magana Patient Status:  Inpatient    1990 MRN [id number]   Location [facility] Attending Rene Villanueva [facility] Day 0 PCP Jono Reardon, Use      Smoking status: Never Smoker      Smokeless tobacco: Never Used    Alcohol use: Not Currently      Frequency: Monthly or less      Comment: socially      Home Meds:   •  Prenatal Vit-Fe Fumarate-FA ( PRENATAL VITAMINS) 28-0.8 MG Oral Tab, Take b Labor: continue expectant management   2. Fetal monitoring: CEFM  3. GBS: positive with PCN allergy and resistance to clindamycin  - continue vancomycin   4.  Pain: may have epidural or IV pain medication per request     Risks, benefits, alternatives and po

## 2020-10-30 NOTE — TELEPHONE ENCOUNTER
G1/P 0 GA 40 2/7 wks patient complaining of ctx since last night around 1800. Became more intense overnight and now have spaced out a bit; currently every 6-7 minutes over the last hour. Not as intense as overnight.    Last OV: 10/28/20 [place] with Dr. Jaimee Lozano-

## 2020-10-30 NOTE — ANESTHESIA PREPROCEDURE EVALUATION
PRE-OP EVALUATION    Patient Name: Renetta Martinez    Pre-op Diagnosis: * No surgery found *    * No surgery found *    * Surgery not found *    Pre-op vitals reviewed.   Temp: 98.6 °F (37 °C)  Pulse: 88  Resp: 18  BP: 141/89     Body mass index is 33.3 k ROS.    Exercise tolerance: good     MET: >4                                           Endo/Other    Negative endo/other ROS. Pulmonary    Negative pulmonary ROS.                        Neuro/Psych

## 2020-10-30 NOTE — PROGRESS NOTES
Patient seen and examined due to increased vaginal bleeding noted on pad. SVE 3/80/-2 and posterior. Cephalic presentation noted. AROM performed with moderate amount of dark, bloody fluid. No evidence of tissue or umbilical cord noted.  Discussion held with

## 2020-10-30 NOTE — PROGRESS NOTES
Patient seen and examined again per her request. Pad noted for lighter bleeding and more clear fluid. SVE 4/80/-2 and anterior. FHT overall reassuring and category 2. D/w patient recommendation to consider epidural for pain management.  She reports increase

## 2020-10-30 NOTE — ANESTHESIA PROCEDURE NOTES
Labor Analgesia  Performed by: Dominic Malagon DO  Authorized by: Dominic Malagon DO       General Information and Staff    Start Time:   Anesthesiologist: Dominic Malagon DO  Performed by:   Anesthesiologist  Patient Location:  OB  Site Identification: surface landmar

## 2020-10-30 NOTE — PROGRESS NOTES
Pt is a 35year old female admitted to TRG3/TRG3-A. Patient presents with:  R/o Labor: uterine contractions started yesterday more intense overnight and then spaced out this am. Uterine contractions again increased around noon today.      Pt is [id number]

## 2020-10-30 NOTE — TELEPHONE ENCOUNTER
Contacted patient. She is on her way to triage now. States she's having more pain. Patient did have ctx while on the phone with this RN and she was unable to continue speaking. Let patient know that triage would be notified.     Spoke with Ramiro Prakash again in [place]

## 2020-10-31 PROCEDURE — 59400 OBSTETRICAL CARE: CPT | Performed by: OBSTETRICS & GYNECOLOGY

## 2020-10-31 RX ORDER — BISACODYL 10 MG
10 SUPPOSITORY, RECTAL RECTAL ONCE AS NEEDED
Status: DISCONTINUED | OUTPATIENT
Start: 2020-10-31 | End: 2020-11-01

## 2020-10-31 RX ORDER — ACETAMINOPHEN 325 MG/1
650 TABLET ORAL EVERY 6 HOURS PRN
Status: DISCONTINUED | OUTPATIENT
Start: 2020-10-31 | End: 2020-11-01

## 2020-10-31 RX ORDER — IBUPROFEN 600 MG/1
600 TABLET ORAL EVERY 6 HOURS
Status: DISCONTINUED | OUTPATIENT
Start: 2020-10-31 | End: 2020-11-01

## 2020-10-31 RX ORDER — DOCUSATE SODIUM 100 MG/1
100 CAPSULE, LIQUID FILLED ORAL
Status: DISCONTINUED | OUTPATIENT
Start: 2020-10-31 | End: 2020-11-01

## 2020-10-31 RX ORDER — MISOPROSTOL 200 UG/1
TABLET ORAL
Status: COMPLETED
Start: 2020-10-31 | End: 2020-10-31

## 2020-10-31 RX ORDER — SIMETHICONE 80 MG
80 TABLET,CHEWABLE ORAL 3 TIMES DAILY PRN
Status: DISCONTINUED | OUTPATIENT
Start: 2020-10-31 | End: 2020-11-01

## 2020-10-31 RX ORDER — MISOPROSTOL 200 UG/1
1000 TABLET ORAL ONCE
Status: DISCONTINUED | OUTPATIENT
Start: 2020-10-31 | End: 2020-10-31

## 2020-10-31 NOTE — L&D DELIVERY NOTE
Nagi Peng [[id number]]    Labor Events     labor?: No   steroids?: None  Antibiotics received during labor?: Yes  Antibiotics (enter # doses in comment): other  Rupture date/time: 10/30/2020 1725     Rupture type: AROM  Fluid color: Other Minute:  5 Minute:  10 Minute:  15 Minute:  20 Minute:    Skin color: 0  0       Heart rate: 2  2       Reflex irritablity: 2  2       Muscle tone: 2  2       Respiratory effort: 2  2       Total: 8  8          Apgars assigned by: [facility] [place] RN   disposi was performed due to fluid noted. The cord was doubly clamped then cut after 30 seconds of cord pulsation noted. The baby was placed on the mother's abdomen at her request. A segment of the umbilical cord was obtained for cord gas analysis.  The cord bloo

## 2020-10-31 NOTE — PLAN OF CARE
Problem: BIRTH - VAGINAL/ SECTION  Goal: Fetal and maternal status remain reassuring during the birth process  Description: INTERVENTIONS:  - Monitor vital signs  - Monitor fetal heart rate  - Monitor uterine activity  - Monitor labor progression strengthening/mobility  - Encourage toileting schedule  Outcome: Completed

## 2020-10-31 NOTE — PROGRESS NOTES
[facility]  Obstetrics and Gynecology    OB/GYN: Intrapartum Progress Note     SUBJECTIVE:  Patient is a 35year old Kane Cheadle female at [address] [place] [address] admitted for labor. Patient reports doing well. Pain well controlled.      OBJECTIVE:   10/30/20  2114 10/

## 2020-11-01 VITALS
DIASTOLIC BLOOD PRESSURE: 78 MMHG | TEMPERATURE: 98 F | RESPIRATION RATE: 18 BRPM | OXYGEN SATURATION: 100 % | BODY MASS INDEX: 33.31 KG/M2 | HEIGHT: 63 IN | WEIGHT: 188 LBS | SYSTOLIC BLOOD PRESSURE: 119 MMHG | HEART RATE: 93 BPM

## 2020-11-01 PROBLEM — Z34.93 ENCOUNTER FOR SUPERVISION OF NORMAL PREGNANCY IN THIRD TRIMESTER (HCC): Status: RESOLVED | Noted: 2020-04-14 | Resolved: 2020-11-01

## 2020-11-01 PROBLEM — Z34.93 ENCOUNTER FOR SUPERVISION OF NORMAL PREGNANCY IN THIRD TRIMESTER: Status: RESOLVED | Noted: 2020-04-14 | Resolved: 2020-11-01

## 2020-11-01 RX ORDER — IBUPROFEN 600 MG/1
600 TABLET ORAL EVERY 8 HOURS PRN
Qty: 30 TABLET | Refills: 0 | Status: SHIPPED | OUTPATIENT
Start: 2020-11-01 | End: 2020-12-07 | Stop reason: ALTCHOICE

## 2020-11-01 NOTE — DISCHARGE SUMMARY
Remi Worley Patient Status:  inpatient    1990 MRN [id number]   Location 1110/1110-A Attending [facility] O[facility]   Hosp Day # 2 PCP Sina Pickett,      VAGINAL DELIVERY DISCHARGE SUMMARY     Admit date: 10/30/2020    Discharge date: 2020     A

## 2020-11-01 NOTE — PROGRESS NOTES
VAGINAL DELIVERY POSTPARTUM DAY 1    Subjective:   Patient without complaints. Bleeding normal.  Ambulating without difficulty. Urinating without difficulty.     Objective:      11/01/20  0813   BP: 119/78   Pulse: 93   Resp: 18   Temp:      Tmax: Temp (2

## 2020-11-01 NOTE — PROGRESS NOTES
Labor Analgesia Follow Up Note    Patient underwent epidural anesthesia for labor analgesia,    Placenta Date/Time: 10/31/2020  8:19 PM    Delivery Date/Time[de-identified] 10/31/2020  2:14 AM    /78   Pulse 93   Temp 97.8 °F (36.6 °C) (Oral)   Resp 18   Ht 1.6 m

## 2020-11-03 ENCOUNTER — TELEPHONE (OUTPATIENT)
Dept: OBGYN UNIT | Facility: HOSPITAL | Age: 30
End: 2020-11-03

## 2020-11-03 NOTE — PROGRESS NOTES
Bertin Saucedo Call completed: Mom reports that she and infant are doing well. Has had pediatrician F/U visit. Reminded to schedule postpartum follow up visit. No complaints of PPD. Reviewed basic self and infant care.    Encouraged to follow up

## 2020-11-08 ENCOUNTER — TELEPHONE (OUTPATIENT)
Dept: LACTATION | Facility: HOSPITAL | Age: 30
End: 2020-11-08

## 2020-11-09 ENCOUNTER — TELEPHONE (OUTPATIENT)
Dept: OBGYN CLINIC | Facility: CLINIC | Age: 30
End: 2020-11-09

## 2020-11-09 ENCOUNTER — TELEPHONE (OUTPATIENT)
Dept: FAMILY MEDICINE CLINIC | Facility: CLINIC | Age: 30
End: 2020-11-09

## 2020-11-09 NOTE — TELEPHONE ENCOUNTER
Called patient, advised we need the name and specialty of the provider she will be seeing next week.      She will get that information and call back

## 2020-11-09 NOTE — TELEPHONE ENCOUNTER
Issues Identified: (actual or potential)  Routine feeding questions related to latch, positioning.  pumping and jaundiice    Infant Assessment  Output past 24 hours   Voids 8, Stools number 8 and Stool color yellow    Infant Growth  Birth weight 7 # 13 oz

## 2020-11-09 NOTE — TELEPHONE ENCOUNTER
Call to patient. Verified that she needs a referral for lactation consult. Referral request placed. Patient aware.

## 2020-11-09 NOTE — TELEPHONE ENCOUNTER
Yannick Ewing is calling she needs a referral to the breastfeeding clinic on [address] he has a appt tomorrow

## 2020-11-10 ENCOUNTER — TELEPHONE (OUTPATIENT)
Dept: OBGYN CLINIC | Facility: CLINIC | Age: 30
End: 2020-11-10

## 2020-11-10 ENCOUNTER — NURSE ONLY (OUTPATIENT)
Dept: LACTATION | Facility: HOSPITAL | Age: 30
End: 2020-11-10
Attending: OBSTETRICS & GYNECOLOGY
Payer: COMMERCIAL

## 2020-11-10 VITALS — TEMPERATURE: 98 F

## 2020-11-10 PROCEDURE — 99213 OFFICE O/P EST LOW 20 MIN: CPT

## 2020-11-10 NOTE — TELEPHONE ENCOUNTER
Patient had  on 10/31/2020. She stated she has been having vaginal bleeding but some days it is heavier than others. Patient denies any severe pain. She is breast feeding. She is taking Ibuprofen for pain relief. She is not clotting.    Last OV date:

## 2020-11-10 NOTE — TELEPHONE ENCOUNTER
Patient called states she is seeing Elvis Cooper the Lactation Consultant, that is all the info she was given to give to us.

## 2020-12-07 ENCOUNTER — POSTPARTUM (OUTPATIENT)
Dept: OBGYN CLINIC | Facility: CLINIC | Age: 30
End: 2020-12-07

## 2020-12-07 VITALS
SYSTOLIC BLOOD PRESSURE: 100 MMHG | DIASTOLIC BLOOD PRESSURE: 60 MMHG | HEIGHT: 63 IN | WEIGHT: 163.19 LBS | HEART RATE: 83 BPM | BODY MASS INDEX: 28.91 KG/M2

## 2020-12-07 DIAGNOSIS — N93.9 NONMENSTRUAL VAGINAL BLEEDING: ICD-10-CM

## 2020-12-07 PROBLEM — Z34.90 PREGNANCY: Status: RESOLVED | Noted: 2020-10-30 | Resolved: 2020-12-07

## 2020-12-07 PROBLEM — O99.820 GBS (GROUP B STREPTOCOCCUS CARRIER), +RV CULTURE, CURRENTLY PREGNANT (HCC): Status: RESOLVED | Noted: 2020-10-09 | Resolved: 2020-12-07

## 2020-12-07 PROBLEM — O99.820 GBS (GROUP B STREPTOCOCCUS CARRIER), +RV CULTURE, CURRENTLY PREGNANT: Status: RESOLVED | Noted: 2020-10-09 | Resolved: 2020-12-07

## 2020-12-07 PROBLEM — Z78.9 NOT IMMUNE TO RUBELLA: Status: RESOLVED | Noted: 2020-04-15 | Resolved: 2020-12-07

## 2020-12-07 PROBLEM — Z34.90 PREGNANCY (HCC): Status: RESOLVED | Noted: 2020-10-30 | Resolved: 2020-12-07

## 2020-12-07 PROCEDURE — 3078F DIAST BP <80 MM HG: CPT | Performed by: OBSTETRICS & GYNECOLOGY

## 2020-12-07 PROCEDURE — 3074F SYST BP LT 130 MM HG: CPT | Performed by: OBSTETRICS & GYNECOLOGY

## 2020-12-07 PROCEDURE — 3008F BODY MASS INDEX DOCD: CPT | Performed by: OBSTETRICS & GYNECOLOGY

## 2020-12-07 NOTE — PROGRESS NOTES
[facility]  Obstetrics and Gynecology   Postpartum Progress Note    Subjective:     Prudencio Mota is a 35year old  female who is s/p  on 10/31/2020.  Her pregnancy was complicated by GBS positive with PCN allergy and resistance to well, no complaints   - no abnormal findings on physical exam   - may return to normal activity   Contraception counseling   - discussion held with patient about family planning and contraception  - pt reports no contraception   - advise condom use for now

## 2020-12-08 ENCOUNTER — LAB ENCOUNTER (OUTPATIENT)
Dept: LAB | Age: 30
End: 2020-12-08
Attending: OBSTETRICS & GYNECOLOGY
Payer: COMMERCIAL

## 2020-12-08 DIAGNOSIS — N93.9 NONMENSTRUAL VAGINAL BLEEDING: ICD-10-CM

## 2020-12-08 PROCEDURE — 36415 COLL VENOUS BLD VENIPUNCTURE: CPT

## 2020-12-08 PROCEDURE — 84702 CHORIONIC GONADOTROPIN TEST: CPT

## 2021-01-25 ENCOUNTER — PATIENT MESSAGE (OUTPATIENT)
Dept: OBGYN CLINIC | Facility: CLINIC | Age: 31
End: 2021-01-25

## 2021-01-25 NOTE — TELEPHONE ENCOUNTER
Pt called and wanted to make sure you look at the picture she attached through her mychart. She said its become more painful as the day went on.

## 2021-01-25 NOTE — TELEPHONE ENCOUNTER
Please advise urgent care or ED visit for evaluation including breast US and physical exam. Concern for breast abscess.

## 2021-01-25 NOTE — TELEPHONE ENCOUNTER
Patient informed. She stated she does not want to go to an urgent care or ER. I advised patient that if she has an abscess it should be assessed immediately.  I advised patient that we can schedule per her request but if she has any worsening of pain, lily

## 2021-01-26 ENCOUNTER — OFFICE VISIT (OUTPATIENT)
Dept: OBGYN CLINIC | Facility: CLINIC | Age: 31
End: 2021-01-26

## 2021-01-26 VITALS
WEIGHT: 158 LBS | DIASTOLIC BLOOD PRESSURE: 64 MMHG | HEIGHT: 63 IN | BODY MASS INDEX: 28 KG/M2 | SYSTOLIC BLOOD PRESSURE: 100 MMHG | TEMPERATURE: 99 F | HEART RATE: 103 BPM

## 2021-01-26 PROCEDURE — 3008F BODY MASS INDEX DOCD: CPT | Performed by: NURSE PRACTITIONER

## 2021-01-26 PROCEDURE — 3074F SYST BP LT 130 MM HG: CPT | Performed by: NURSE PRACTITIONER

## 2021-01-26 PROCEDURE — 99213 OFFICE O/P EST LOW 20 MIN: CPT | Performed by: NURSE PRACTITIONER

## 2021-01-26 PROCEDURE — 3078F DIAST BP <80 MM HG: CPT | Performed by: NURSE PRACTITIONER

## 2021-01-26 RX ORDER — CLINDAMYCIN HYDROCHLORIDE 300 MG/1
300 CAPSULE ORAL 3 TIMES DAILY
Qty: 30 CAPSULE | Refills: 0 | Status: SHIPPED | OUTPATIENT
Start: 2021-01-26 | End: 2021-02-05

## 2021-01-26 NOTE — PATIENT INSTRUCTIONS
Warm Compress 4-5 times daily  Continue to nurse or pump every 2-3 hours  Antibiotics 3 times daily  Call with any fever, worsening skin redness, swelling, or drainage

## 2021-01-26 NOTE — PROGRESS NOTES
Gyne note     S: patient is a 35year old yo  here for a 3 day history of a left sided breast infection. She denies any fever, chills, or flu- like symptoms.  She started to have a small swollen red area on her breast. Over the last 2 days the jillian

## 2021-01-28 ENCOUNTER — OFFICE VISIT (OUTPATIENT)
Dept: OBGYN CLINIC | Facility: CLINIC | Age: 31
End: 2021-01-28

## 2021-01-28 VITALS
DIASTOLIC BLOOD PRESSURE: 66 MMHG | BODY MASS INDEX: 28.29 KG/M2 | WEIGHT: 159.63 LBS | SYSTOLIC BLOOD PRESSURE: 116 MMHG | HEIGHT: 63 IN | HEART RATE: 91 BPM

## 2021-01-28 DIAGNOSIS — O91.119 ABSCESS OF BREAST, ANTEPARTUM: Primary | ICD-10-CM

## 2021-01-28 PROCEDURE — 3074F SYST BP LT 130 MM HG: CPT | Performed by: OBSTETRICS & GYNECOLOGY

## 2021-01-28 PROCEDURE — 3008F BODY MASS INDEX DOCD: CPT | Performed by: OBSTETRICS & GYNECOLOGY

## 2021-01-28 PROCEDURE — 99213 OFFICE O/P EST LOW 20 MIN: CPT | Performed by: OBSTETRICS & GYNECOLOGY

## 2021-01-28 PROCEDURE — 3078F DIAST BP <80 MM HG: CPT | Performed by: OBSTETRICS & GYNECOLOGY

## 2021-01-28 NOTE — PROGRESS NOTES
GYN H&P     2021  9:17 PM    CC: Patient is here for f/u of breast abscess    HPI: patient is a 35year old  here for her breast abscess. Pt delivered on 10/31/20 via   Saw Marsha Radha 3 days ago with breast abscess. Rx Clindamycin.  She retur No current facility-administered medications on file prior to visit.      Family History   Problem Relation Age of Onset   • Hypertension Father    • Lipids Father    • Heart Disorder Father    • No Known Problems Mother    • Cancer Maternal Grandmother We as OB/GYN also do not perform I and D of breast abscess  We reviewed she needs a stat breast US and potentially follow up with breast surgery, Dr. Christel Nelson  For now, I recommend she continue PO antibiotics as Rx and I stressed the importance of warm compr

## 2021-01-29 ENCOUNTER — HOSPITAL ENCOUNTER (OUTPATIENT)
Dept: ULTRASOUND IMAGING | Age: 31
Discharge: HOME | End: 2021-01-29
Attending: OBSTETRICS & GYNECOLOGY
Payer: COMMERCIAL

## 2021-01-29 ENCOUNTER — TELEPHONE (OUTPATIENT)
Dept: OBGYN CLINIC | Facility: CLINIC | Age: 31
End: 2021-01-29

## 2021-01-29 PROCEDURE — 76641 ULTRASOUND BREAST COMPLETE: CPT | Performed by: OBSTETRICS & GYNECOLOGY

## 2021-01-29 NOTE — PROGRESS NOTES
Results reviewed with patient via phone. Overnight she reports drainage and it looks more like a crater on her breast (abscess drained and likely resolving). She can hold off on having the breast surgeon consult but I would like to see her next week.  Keeley

## 2021-01-29 NOTE — TELEPHONE ENCOUNTER
Patient called stating Dr Chaitanya Fontaine told her to come in next week. There are only accutes left and does not look like Dr Chaitanya Fontaine routed to  it is just in notes after patient called and researched please advise.     Thank you

## 2021-01-30 NOTE — TELEPHONE ENCOUNTER
PT is scheduled  Future Appointments   Date Time Provider Fletcher Mariel   2/2/2021  2:00 PM Denice Aase, MD [facility] [address]   6/10/2021  5:00 PM Lanette Jhaveri MD [facility] [address]

## 2021-01-31 NOTE — PROGRESS NOTES
GYN H&P     2021  1:39 PM    CC: Patient is here for f/u of breast abscess    HPI: patient is a 35year old  here for her f/u of breast abscess  She was seen on 21 as f/u.  She was noted to have breast abscess and was put on PO clindamycin Grandfather    • Cancer Paternal Aunt        Review of Systems   Constitutional: Negative for activity change and appetite change. Gastrointestinal: Negative for abdominal pain, blood in stool and abdominal distention.    Genitourinary: Negative for bladd

## 2021-02-02 ENCOUNTER — OFFICE VISIT (OUTPATIENT)
Dept: OBGYN CLINIC | Facility: CLINIC | Age: 31
End: 2021-02-02

## 2021-02-02 VITALS
WEIGHT: 160.63 LBS | HEART RATE: 83 BPM | DIASTOLIC BLOOD PRESSURE: 76 MMHG | SYSTOLIC BLOOD PRESSURE: 118 MMHG | BODY MASS INDEX: 28.46 KG/M2 | HEIGHT: 63 IN

## 2021-02-02 PROCEDURE — 3074F SYST BP LT 130 MM HG: CPT | Performed by: OBSTETRICS & GYNECOLOGY

## 2021-02-02 PROCEDURE — 3078F DIAST BP <80 MM HG: CPT | Performed by: OBSTETRICS & GYNECOLOGY

## 2021-02-02 PROCEDURE — 3008F BODY MASS INDEX DOCD: CPT | Performed by: OBSTETRICS & GYNECOLOGY

## 2021-02-02 PROCEDURE — 99212 OFFICE O/P EST SF 10 MIN: CPT | Performed by: OBSTETRICS & GYNECOLOGY

## 2021-03-08 ENCOUNTER — TELEPHONE (OUTPATIENT)
Dept: FAMILY MEDICINE CLINIC | Facility: CLINIC | Age: 31
End: 2021-03-08

## 2021-03-08 NOTE — TELEPHONE ENCOUNTER
Pt called states she has ear pain which started yesterday no other symptoms but did state her ear pain has gotten worse through out the day today. Pt aware there are no appt this week but would like to know what she can do for the ear pain.

## 2021-03-08 NOTE — TELEPHONE ENCOUNTER
Spoke to patient. C/o right ear pain. No congestion. No other symptoms. Sudden onset. No redness or drainage. Please advise.

## 2021-03-09 NOTE — TELEPHONE ENCOUNTER
This message was reviewed after hours on 3/8/2021. Please contact patient, if she is still having acute ear pain, recommend patient go to the [facility] walk-in clinic.

## 2021-03-10 ENCOUNTER — TELEPHONE (OUTPATIENT)
Dept: FAMILY MEDICINE CLINIC | Facility: CLINIC | Age: 31
End: 2021-03-10

## 2021-03-10 NOTE — TELEPHONE ENCOUNTER
Pt called and said when she went to the [facility] she was today she would be getting an ear drop and an antibiotic. She only received 1 ear drop from her pharmacy, she wants to know if the ear drop has the antibiotic in it?

## 2021-03-10 NOTE — TELEPHONE ENCOUNTER
Spoke with patient, states she was seen at [facility] immediate care. Advised she would have to contact them for scripts. Per records, confirmed that there were 2 different drops sent to her pharmacy, states she only received one.      She will call the pharma

## 2021-03-11 ENCOUNTER — TELEPHONE (OUTPATIENT)
Dept: FAMILY MEDICINE CLINIC | Facility: CLINIC | Age: 31
End: 2021-03-11

## 2021-03-11 ENCOUNTER — OFFICE VISIT (OUTPATIENT)
Dept: FAMILY MEDICINE CLINIC | Facility: CLINIC | Age: 31
End: 2021-03-11

## 2021-03-11 VITALS
DIASTOLIC BLOOD PRESSURE: 68 MMHG | WEIGHT: 154 LBS | BODY MASS INDEX: 27.29 KG/M2 | TEMPERATURE: 97 F | SYSTOLIC BLOOD PRESSURE: 116 MMHG | OXYGEN SATURATION: 99 % | HEIGHT: 63 IN | HEART RATE: 90 BPM

## 2021-03-11 DIAGNOSIS — H60.311 ACUTE DIFFUSE OTITIS EXTERNA OF RIGHT EAR: Primary | ICD-10-CM

## 2021-03-11 PROCEDURE — 3008F BODY MASS INDEX DOCD: CPT | Performed by: FAMILY MEDICINE

## 2021-03-11 PROCEDURE — 3078F DIAST BP <80 MM HG: CPT | Performed by: FAMILY MEDICINE

## 2021-03-11 PROCEDURE — 3074F SYST BP LT 130 MM HG: CPT | Performed by: FAMILY MEDICINE

## 2021-03-11 PROCEDURE — 99214 OFFICE O/P EST MOD 30 MIN: CPT | Performed by: FAMILY MEDICINE

## 2021-03-11 NOTE — TELEPHONE ENCOUNTER
Pt was sent to [address] [place] [address] on Tuesday and she called today saying her ear still hurts and the pain is going to her jaw now also. She said the pain is getting worse and she doesn't think the drops are working.

## 2021-03-11 NOTE — TELEPHONE ENCOUNTER
OV at [facility] immediate care 3/9/21:  Other infective acute otitis externa of right ear  -     ciprofloxacin-dexamethasone 0.3-0.1 % Otic Suspension; Place 3 drops into the right ear 2 (two) times daily.  APPLY 3 DROPS RIGHT EAR EVERY 12 HOURS FOR 10 DAYS

## 2021-03-11 NOTE — PROGRESS NOTES
Remi Worley is a 35year old female. HPI:     Patient went to an immediate care on 3/9/2021 and was diagnosed with otitis externa and prescribed antifungal eardrop Clotrimazole and also prescribed Ciprodex.   Patient states she used the Blaise David NO/CERON/Cough  CARDIOVASCULAR: Denies CP/palpitations  GI: Denies abdominal pain/nausea/vomiting/diarrhea  NEURO: denies headaches/dizziness  PSYCH: denies depression and anxiety      Immunization History  Administered            Date(s) Administered    MM

## 2021-03-16 ENCOUNTER — TELEPHONE (OUTPATIENT)
Dept: FAMILY MEDICINE CLINIC | Facility: CLINIC | Age: 31
End: 2021-03-16

## 2021-03-16 NOTE — TELEPHONE ENCOUNTER
Champ Sahu went to a ENT on Friday for a ear infection, she had the wax treatment and she needed to go back and have the ear wick taken out, she cannot get a follow up appt with the ENT so they told her to come to her PCP, she needs a 4 or later appt, please

## 2021-03-17 ENCOUNTER — OFFICE VISIT (OUTPATIENT)
Dept: FAMILY MEDICINE CLINIC | Facility: CLINIC | Age: 31
End: 2021-03-17

## 2021-03-17 VITALS
HEART RATE: 87 BPM | DIASTOLIC BLOOD PRESSURE: 64 MMHG | BODY MASS INDEX: 27.11 KG/M2 | TEMPERATURE: 97 F | SYSTOLIC BLOOD PRESSURE: 120 MMHG | WEIGHT: 153 LBS | OXYGEN SATURATION: 98 % | HEIGHT: 63 IN

## 2021-03-17 DIAGNOSIS — H60.311 ACUTE DIFFUSE OTITIS EXTERNA OF RIGHT EAR: Primary | ICD-10-CM

## 2021-03-17 PROCEDURE — 3078F DIAST BP <80 MM HG: CPT | Performed by: FAMILY MEDICINE

## 2021-03-17 PROCEDURE — 99213 OFFICE O/P EST LOW 20 MIN: CPT | Performed by: FAMILY MEDICINE

## 2021-03-17 PROCEDURE — 3008F BODY MASS INDEX DOCD: CPT | Performed by: FAMILY MEDICINE

## 2021-03-17 PROCEDURE — 3074F SYST BP LT 130 MM HG: CPT | Performed by: FAMILY MEDICINE

## 2021-03-17 NOTE — PROGRESS NOTES
Renetta Martinez is a 35year old female. HPI:       Right otitis externa:  ENT placed ear wick which is still in place and has not fallen out. Patient using antibiotic eardrops as prescribed. Pain has resolved.           Wt Readings from Last 6 Enco °C)   Ht 5' 3\" (1.6 m)   Wt 153 lb (69.4 kg)   SpO2 98%   BMI 27.10 kg/m²   GENERAL: NAD, pleasant non-ill-appearing female. Ears: Right EAC with ear wick still in place.   NECK: supple, no adenopathy, no thyromegaly, no masses  NEURO: Alert and Oriented

## 2021-03-22 ENCOUNTER — TELEPHONE (OUTPATIENT)
Dept: FAMILY MEDICINE CLINIC | Facility: CLINIC | Age: 31
End: 2021-03-22

## 2021-03-22 NOTE — TELEPHONE ENCOUNTER
Received fax from Dr iWlliam Carrizales. Nasir Obrien M.D office, patient was seen for otowick in her right ear. Patient is to continue drops and the otowick should fall out. Follow up in one week.

## 2021-04-06 ENCOUNTER — TELEPHONE (OUTPATIENT)
Dept: OBGYN CLINIC | Facility: CLINIC | Age: 31
End: 2021-04-06

## 2021-04-06 ENCOUNTER — OFFICE VISIT (OUTPATIENT)
Dept: OBGYN CLINIC | Facility: CLINIC | Age: 31
End: 2021-04-06

## 2021-04-06 VITALS — DIASTOLIC BLOOD PRESSURE: 70 MMHG | SYSTOLIC BLOOD PRESSURE: 112 MMHG | WEIGHT: 153.63 LBS | BODY MASS INDEX: 27 KG/M2

## 2021-04-06 DIAGNOSIS — N76.4 VULVAR ABSCESS: Primary | ICD-10-CM

## 2021-04-06 DIAGNOSIS — N92.6 IRREGULAR MENSES: ICD-10-CM

## 2021-04-06 PROCEDURE — 87070 CULTURE OTHR SPECIMN AEROBIC: CPT | Performed by: OBSTETRICS & GYNECOLOGY

## 2021-04-06 PROCEDURE — 3078F DIAST BP <80 MM HG: CPT | Performed by: OBSTETRICS & GYNECOLOGY

## 2021-04-06 PROCEDURE — 87076 CULTURE ANAEROBE IDENT EACH: CPT | Performed by: OBSTETRICS & GYNECOLOGY

## 2021-04-06 PROCEDURE — 87205 SMEAR GRAM STAIN: CPT | Performed by: OBSTETRICS & GYNECOLOGY

## 2021-04-06 PROCEDURE — 87075 CULTR BACTERIA EXCEPT BLOOD: CPT | Performed by: OBSTETRICS & GYNECOLOGY

## 2021-04-06 PROCEDURE — 56405 I&D VULVA/PERINEAL ABSCESS: CPT | Performed by: OBSTETRICS & GYNECOLOGY

## 2021-04-06 PROCEDURE — 87077 CULTURE AEROBIC IDENTIFY: CPT | Performed by: OBSTETRICS & GYNECOLOGY

## 2021-04-06 PROCEDURE — 99213 OFFICE O/P EST LOW 20 MIN: CPT | Performed by: OBSTETRICS & GYNECOLOGY

## 2021-04-06 PROCEDURE — 3074F SYST BP LT 130 MM HG: CPT | Performed by: OBSTETRICS & GYNECOLOGY

## 2021-04-06 RX ORDER — SULFAMETHOXAZOLE AND TRIMETHOPRIM 400; 80 MG/1; MG/1
1 TABLET ORAL 2 TIMES DAILY
Qty: 14 TABLET | Refills: 0 | Status: SHIPPED | OUTPATIENT
Start: 2021-04-06 | End: 2021-07-02

## 2021-04-06 RX ORDER — LIDOCAINE HYDROCHLORIDE 10 MG/ML
1 INJECTION, SOLUTION EPIDURAL; INFILTRATION; INTRACAUDAL; PERINEURAL ONCE
Status: DISCONTINUED | OUTPATIENT
Start: 2021-04-06 | End: 2021-08-12

## 2021-04-06 NOTE — PROGRESS NOTES
Subjective:  35year old    Patient presents with:  Vaginal Problem: Pt c/o possible bartholin's cyst, Pt states cyst it has increased in size since . Irregular Periods: Pt states she had a irregular spotting after her last cycle.  Pt states ANAEROBIC CULTURE; Future    Irregular menses    Other orders  -     sulfamethoxazole-trimethoprim (BACTRIM) 400-80 MG Oral Tab; Take 1 tablet by mouth 2 (two) times daily. Return in about 1 week (around 4/13/2021) for Follow-up Visit.

## 2021-04-06 NOTE — TELEPHONE ENCOUNTER
Pt was prescribed medication today and the pharmacist advised against taking medication while breast feeding please advise pt on what she can do.

## 2021-04-06 NOTE — PROCEDURES
Incision and Drainage Procedure Note    PRE-OP DIAGNOSIS: Vulvar abscess    PROCEDURE:  Incision & Drainage of vulvar abscess    INDICATIONS:  The risks (including infection, bleeding, pain, and scarring) and benefits of the procedure were explained to the

## 2021-04-06 NOTE — TELEPHONE ENCOUNTER
Patient informed that OK to continue with antibiotic. Verbalized understanding. No further questions or concerns.

## 2021-04-12 ENCOUNTER — OFFICE VISIT (OUTPATIENT)
Dept: OBGYN CLINIC | Facility: CLINIC | Age: 31
End: 2021-04-12

## 2021-04-12 VITALS
HEART RATE: 110 BPM | BODY MASS INDEX: 27.18 KG/M2 | HEIGHT: 63 IN | DIASTOLIC BLOOD PRESSURE: 60 MMHG | SYSTOLIC BLOOD PRESSURE: 104 MMHG | WEIGHT: 153.38 LBS

## 2021-04-12 DIAGNOSIS — N76.4 VULVAR ABSCESS: Primary | ICD-10-CM

## 2021-04-12 PROCEDURE — 3008F BODY MASS INDEX DOCD: CPT | Performed by: OBSTETRICS & GYNECOLOGY

## 2021-04-12 PROCEDURE — 3074F SYST BP LT 130 MM HG: CPT | Performed by: OBSTETRICS & GYNECOLOGY

## 2021-04-12 PROCEDURE — 3078F DIAST BP <80 MM HG: CPT | Performed by: OBSTETRICS & GYNECOLOGY

## 2021-04-12 PROCEDURE — 99213 OFFICE O/P EST LOW 20 MIN: CPT | Performed by: OBSTETRICS & GYNECOLOGY

## 2021-04-12 NOTE — PROGRESS NOTES
Subjective:  35year old    Patient presents with: Other: 1 wk FU I & D     Follow up I&D of left vulvar abscess one week ago. Currently without complaints. Pain almost completely gone. No fever.     Review of Systems:  Pertinent items are noted

## 2021-04-16 ENCOUNTER — PATIENT MESSAGE (OUTPATIENT)
Dept: FAMILY MEDICINE CLINIC | Facility: CLINIC | Age: 31
End: 2021-04-16

## 2021-04-16 NOTE — TELEPHONE ENCOUNTER
From: Darline Thurston  To: Celia Hernandez DO  Sent: 4/16/2021 12:04 PM CDT  Subject: Other    Hi,    I am going to get a Covid test this afternoon. If it turns out that I am positive, is it still safe to breastfeed?      Thank you,  Pavel Echols

## 2021-05-24 ENCOUNTER — TELEPHONE (OUTPATIENT)
Dept: OBGYN CLINIC | Facility: CLINIC | Age: 31
End: 2021-05-24

## 2021-05-24 NOTE — TELEPHONE ENCOUNTER
Pt calling states Dr. Dela Cruz Sportsman drained an abcess for her about 1 month ago and it has come back, has drainage. Wanted him to see her next week (this week is to busy with work). No appts in all 3 offices next week.     Pt requesting to speak with nurse if netta

## 2021-05-24 NOTE — TELEPHONE ENCOUNTER
Pt s/p I&D of left vulvar abscess on 4/6/21; treated with Bactrim as well. Pt was seen on 4/12/21 for f/u; resolution of abscess. Called patient; no answer; mailbox full.

## 2021-05-26 NOTE — TELEPHONE ENCOUNTER
Patient contacted office. Area remains tender, but now is flat. Has not closed up completely, but has definitely become a lot smaller. Denies fever/chills. Feels like it is resolving.      Patient has an appt on 6/23/21 for annual. Desires to monitor are

## 2021-06-10 ENCOUNTER — OFFICE VISIT (OUTPATIENT)
Dept: OBGYN CLINIC | Facility: CLINIC | Age: 31
End: 2021-06-10

## 2021-06-10 VITALS
HEIGHT: 63 IN | SYSTOLIC BLOOD PRESSURE: 102 MMHG | DIASTOLIC BLOOD PRESSURE: 62 MMHG | WEIGHT: 149 LBS | BODY MASS INDEX: 26.4 KG/M2

## 2021-06-10 DIAGNOSIS — L72.3 SEBACEOUS CYST: Primary | ICD-10-CM

## 2021-06-10 DIAGNOSIS — N92.6 LATE MENSES: ICD-10-CM

## 2021-06-10 PROBLEM — H60.311 ACUTE DIFFUSE OTITIS EXTERNA OF RIGHT EAR: Status: RESOLVED | Noted: 2021-03-17 | Resolved: 2021-06-10

## 2021-06-10 PROCEDURE — 99213 OFFICE O/P EST LOW 20 MIN: CPT | Performed by: OBSTETRICS & GYNECOLOGY

## 2021-06-10 PROCEDURE — 3078F DIAST BP <80 MM HG: CPT | Performed by: OBSTETRICS & GYNECOLOGY

## 2021-06-10 PROCEDURE — 3008F BODY MASS INDEX DOCD: CPT | Performed by: OBSTETRICS & GYNECOLOGY

## 2021-06-10 PROCEDURE — 3074F SYST BP LT 130 MM HG: CPT | Performed by: OBSTETRICS & GYNECOLOGY

## 2021-06-18 ENCOUNTER — TELEPHONE (OUTPATIENT)
Dept: OBGYN CLINIC | Facility: CLINIC | Age: 31
End: 2021-06-18

## 2021-06-18 DIAGNOSIS — N90.7 VULVAR CYST: Primary | ICD-10-CM

## 2021-06-18 NOTE — TELEPHONE ENCOUNTER
----- Message from Portillo Linda MD sent at 6/12/2021  9:13 AM CDT -----  Surgeon: Dr. Portillo Linda    Date: After 7/15  OK for [facility] surgery center    Assistant: ian    Type of Admit/Expected Discharge Department: Outpatient/Same Day    LOS: 0    Procedure

## 2021-06-18 NOTE — TELEPHONE ENCOUNTER
Surgery scheduled for 8/12/2021 at 10:30  Post op   Future Appointments   Date Time Provider Fletcher Hollandi   6/23/2021  2:00 PM Shanna Romberg, MD [facility] [address]   8/27/2021  2:45 PM Jolie Nieto MD [facility] [address]     Orders en

## 2021-07-02 ENCOUNTER — OFFICE VISIT (OUTPATIENT)
Dept: OBGYN CLINIC | Facility: CLINIC | Age: 31
End: 2021-07-02

## 2021-07-02 VITALS
SYSTOLIC BLOOD PRESSURE: 118 MMHG | DIASTOLIC BLOOD PRESSURE: 70 MMHG | HEART RATE: 79 BPM | WEIGHT: 149.63 LBS | HEIGHT: 63 IN | BODY MASS INDEX: 26.51 KG/M2

## 2021-07-02 DIAGNOSIS — Z12.4 SCREENING FOR CERVICAL CANCER: ICD-10-CM

## 2021-07-02 DIAGNOSIS — Z01.419 ENCOUNTER FOR GYNECOLOGICAL EXAMINATION WITHOUT ABNORMAL FINDING: Primary | ICD-10-CM

## 2021-07-02 PROBLEM — L72.3 SEBACEOUS CYST: Status: ACTIVE | Noted: 2021-07-02

## 2021-07-02 PROCEDURE — G0438 PPPS, INITIAL VISIT: HCPCS | Performed by: OBSTETRICS & GYNECOLOGY

## 2021-07-02 PROCEDURE — 88175 CYTOPATH C/V AUTO FLUID REDO: CPT | Performed by: OBSTETRICS & GYNECOLOGY

## 2021-07-02 PROCEDURE — 3008F BODY MASS INDEX DOCD: CPT | Performed by: OBSTETRICS & GYNECOLOGY

## 2021-07-02 PROCEDURE — 99395 PREV VISIT EST AGE 18-39: CPT | Performed by: OBSTETRICS & GYNECOLOGY

## 2021-07-02 PROCEDURE — 3078F DIAST BP <80 MM HG: CPT | Performed by: OBSTETRICS & GYNECOLOGY

## 2021-07-02 PROCEDURE — 3074F SYST BP LT 130 MM HG: CPT | Performed by: OBSTETRICS & GYNECOLOGY

## 2021-07-02 NOTE — PROGRESS NOTES
Subjective:  Patient presents with:  Physical    35year old female who presents for annual well woman visit. Left sebaceous cyst is draining and less painful. Patient's last menstrual period was 04/03/2021 (exact date).   Hx Prior Abnormal Pap: No  Pap masses, no adenopathy  Lungs: Clear to auscultation bilaterally, no rales, wheezes or rhonchi  Breasts: Symmetric, non-tender, no masses, lesions, retraction, dimpling or discharge bilaterally, no axillary or supraclavicular lymphadenopathy  Heart[de-identified] Regula

## 2021-08-02 ENCOUNTER — OFFICE VISIT (OUTPATIENT)
Dept: FAMILY MEDICINE CLINIC | Facility: CLINIC | Age: 31
End: 2021-08-02

## 2021-08-02 ENCOUNTER — TELEPHONE (OUTPATIENT)
Dept: FAMILY MEDICINE CLINIC | Facility: CLINIC | Age: 31
End: 2021-08-02

## 2021-08-02 VITALS
WEIGHT: 146 LBS | OXYGEN SATURATION: 97 % | TEMPERATURE: 98 F | SYSTOLIC BLOOD PRESSURE: 102 MMHG | DIASTOLIC BLOOD PRESSURE: 60 MMHG | HEIGHT: 63 IN | BODY MASS INDEX: 25.87 KG/M2 | HEART RATE: 100 BPM

## 2021-08-02 DIAGNOSIS — H60.311 ACUTE DIFFUSE OTITIS EXTERNA OF RIGHT EAR: Primary | ICD-10-CM

## 2021-08-02 PROCEDURE — 3078F DIAST BP <80 MM HG: CPT | Performed by: FAMILY MEDICINE

## 2021-08-02 PROCEDURE — 3074F SYST BP LT 130 MM HG: CPT | Performed by: FAMILY MEDICINE

## 2021-08-02 PROCEDURE — 99213 OFFICE O/P EST LOW 20 MIN: CPT | Performed by: FAMILY MEDICINE

## 2021-08-02 PROCEDURE — 3008F BODY MASS INDEX DOCD: CPT | Performed by: FAMILY MEDICINE

## 2021-08-02 RX ORDER — OFLOXACIN 3 MG/ML
10 SOLUTION AURICULAR (OTIC) DAILY
Qty: 5 ML | Refills: 0 | Status: SHIPPED | OUTPATIENT
Start: 2021-08-02 | End: 2021-08-09

## 2021-08-02 NOTE — PROGRESS NOTES
Kezia Lorenzo is a 35year old female. HPI:       Patient presents with:  Ear Problem: Right ear pain for the last three days.      This is the same ear that she had pain in March 2021 at which time she was referred to ENT who placed a wick in the r History  Administered            Date(s) Administered    MMR                   10/31/2020      TDAP                  08/07/2020      EXAM:   /60 (BP Location: Left arm, Patient Position: Sitting, Cuff Size: adult)   Pulse 100   Temp 97.8 °F (36.6 °C)

## 2021-08-02 NOTE — TELEPHONE ENCOUNTER
Patient complains of right inner ear pain. States feels swollen inside. Denies drainage. No fever. Says it is just like what happened back in March. Has not taken anything OTC since she is still nursing. Has not reached out to ENT. Please advise.

## 2021-08-02 NOTE — TELEPHONE ENCOUNTER
Oriana Mason would like to see if Dr Deanna Carroll can see her for a ear infection,she has had since Sat, Please call Oriana Mason at [phone]

## 2021-08-02 NOTE — TELEPHONE ENCOUNTER
Can patient come in to see me around 1 or 1:15 PM, we can work her into the schedule as an overbook.

## 2021-08-05 RX ORDER — ACETAMINOPHEN 500 MG
1000 TABLET ORAL ONCE
Status: CANCELLED | OUTPATIENT
Start: 2021-08-05 | End: 2021-08-05

## 2021-08-09 ENCOUNTER — LAB ENCOUNTER (OUTPATIENT)
Dept: LAB | Age: 31
End: 2021-08-09
Attending: OBSTETRICS & GYNECOLOGY
Payer: COMMERCIAL

## 2021-08-09 DIAGNOSIS — N90.7 VULVAR CYST: ICD-10-CM

## 2021-08-10 LAB — SARS-COV-2 RNA RESP QL NAA+PROBE: NOT DETECTED

## 2021-08-11 NOTE — H&P
[facility]  Obstetrics and Gynecology  Gynecologic History & Physical    Júnior Gaming Patient Status:  Hospital Outpatient Surgery    1990 MRN [id number]   [facility] SURGERY Attending Aydin Correa MD   Hospital Day 0 P without obvious deformity, atraumatic  Neck: No thyromegaly, supple, non-tender, no masses, no adenopathy  Lungs: Clear to auscultation bilaterally, no rales, wheezes or rhonchi  Heart[de-identified] Regular rate and rhythm, no gallops or murmurs  Abdomen: Soft, non-te

## 2021-08-12 ENCOUNTER — HOSPITAL ENCOUNTER (OUTPATIENT)
Facility: HOSPITAL | Age: 31
Setting detail: HOSPITAL OUTPATIENT SURGERY
Discharge: HOME | End: 2021-08-12
Attending: OBSTETRICS & GYNECOLOGY | Admitting: OBSTETRICS & GYNECOLOGY
Payer: COMMERCIAL

## 2021-08-12 ENCOUNTER — ANESTHESIA (OUTPATIENT)
Dept: SURGERY | Facility: HOSPITAL | Age: 31
End: 2021-08-12
Payer: COMMERCIAL

## 2021-08-12 ENCOUNTER — ANESTHESIA EVENT (OUTPATIENT)
Dept: SURGERY | Facility: HOSPITAL | Age: 31
End: 2021-08-12
Payer: COMMERCIAL

## 2021-08-12 VITALS
RESPIRATION RATE: 12 BRPM | BODY MASS INDEX: 26.25 KG/M2 | OXYGEN SATURATION: 100 % | HEART RATE: 66 BPM | WEIGHT: 148.13 LBS | HEIGHT: 63 IN | TEMPERATURE: 98 F | DIASTOLIC BLOOD PRESSURE: 72 MMHG | SYSTOLIC BLOOD PRESSURE: 102 MMHG

## 2021-08-12 DIAGNOSIS — N90.7 VULVAR CYST: Primary | ICD-10-CM

## 2021-08-12 LAB — B-HCG UR QL: NEGATIVE

## 2021-08-12 PROCEDURE — 0UBMXZZ EXCISION OF VULVA, EXTERNAL APPROACH: ICD-10-PCS | Performed by: OBSTETRICS & GYNECOLOGY

## 2021-08-12 PROCEDURE — 11421 EXC H-F-NK-SP B9+MARG 0.6-1: CPT | Performed by: OBSTETRICS & GYNECOLOGY

## 2021-08-12 RX ORDER — ONDANSETRON 2 MG/ML
INJECTION INTRAMUSCULAR; INTRAVENOUS AS NEEDED
Status: DISCONTINUED | OUTPATIENT
Start: 2021-08-12 | End: 2021-08-12 | Stop reason: SURG

## 2021-08-12 RX ORDER — HYDROCODONE BITARTRATE AND ACETAMINOPHEN 5; 325 MG/1; MG/1
1 TABLET ORAL AS NEEDED
Status: DISCONTINUED | OUTPATIENT
Start: 2021-08-12 | End: 2021-08-12

## 2021-08-12 RX ORDER — LIDOCAINE HYDROCHLORIDE 10 MG/ML
INJECTION, SOLUTION INFILTRATION; PERINEURAL AS NEEDED
Status: DISCONTINUED | OUTPATIENT
Start: 2021-08-12 | End: 2021-08-12 | Stop reason: HOSPADM

## 2021-08-12 RX ORDER — EPHEDRINE SULFATE 50 MG/ML
INJECTION INTRAVENOUS AS NEEDED
Status: DISCONTINUED | OUTPATIENT
Start: 2021-08-12 | End: 2021-08-12 | Stop reason: SURG

## 2021-08-12 RX ORDER — ACETAMINOPHEN 500 MG
1000 TABLET ORAL ONCE
COMMUNITY

## 2021-08-12 RX ORDER — SODIUM CHLORIDE, SODIUM LACTATE, POTASSIUM CHLORIDE, CALCIUM CHLORIDE 600; 310; 30; 20 MG/100ML; MG/100ML; MG/100ML; MG/100ML
INJECTION, SOLUTION INTRAVENOUS CONTINUOUS
Status: DISCONTINUED | OUTPATIENT
Start: 2021-08-12 | End: 2021-08-12

## 2021-08-12 RX ORDER — ACETAMINOPHEN 500 MG
1000 TABLET ORAL ONCE AS NEEDED
Status: DISCONTINUED | OUTPATIENT
Start: 2021-08-12 | End: 2021-08-12

## 2021-08-12 RX ORDER — MIDAZOLAM HYDROCHLORIDE 1 MG/ML
INJECTION INTRAMUSCULAR; INTRAVENOUS AS NEEDED
Status: DISCONTINUED | OUTPATIENT
Start: 2021-08-12 | End: 2021-08-12 | Stop reason: SURG

## 2021-08-12 RX ORDER — KETOROLAC TROMETHAMINE 30 MG/ML
INJECTION, SOLUTION INTRAMUSCULAR; INTRAVENOUS AS NEEDED
Status: DISCONTINUED | OUTPATIENT
Start: 2021-08-12 | End: 2021-08-12 | Stop reason: SURG

## 2021-08-12 RX ORDER — ONDANSETRON 2 MG/ML
4 INJECTION INTRAMUSCULAR; INTRAVENOUS AS NEEDED
Status: DISCONTINUED | OUTPATIENT
Start: 2021-08-12 | End: 2021-08-12

## 2021-08-12 RX ORDER — NALOXONE HYDROCHLORIDE 0.4 MG/ML
80 INJECTION, SOLUTION INTRAMUSCULAR; INTRAVENOUS; SUBCUTANEOUS AS NEEDED
Status: DISCONTINUED | OUTPATIENT
Start: 2021-08-12 | End: 2021-08-12

## 2021-08-12 RX ORDER — HYDROCODONE BITARTRATE AND ACETAMINOPHEN 5; 325 MG/1; MG/1
2 TABLET ORAL AS NEEDED
Status: DISCONTINUED | OUTPATIENT
Start: 2021-08-12 | End: 2021-08-12

## 2021-08-12 RX ORDER — HYDROMORPHONE HYDROCHLORIDE 1 MG/ML
0.4 INJECTION, SOLUTION INTRAMUSCULAR; INTRAVENOUS; SUBCUTANEOUS EVERY 5 MIN PRN
Status: DISCONTINUED | OUTPATIENT
Start: 2021-08-12 | End: 2021-08-12

## 2021-08-12 RX ADMIN — ONDANSETRON 4 MG: 2 INJECTION INTRAMUSCULAR; INTRAVENOUS at 10:49:00

## 2021-08-12 RX ADMIN — SODIUM CHLORIDE, SODIUM LACTATE, POTASSIUM CHLORIDE, CALCIUM CHLORIDE: 600; 310; 30; 20 INJECTION, SOLUTION INTRAVENOUS at 10:41:00

## 2021-08-12 RX ADMIN — MIDAZOLAM HYDROCHLORIDE 2 MG: 1 INJECTION INTRAMUSCULAR; INTRAVENOUS at 10:42:00

## 2021-08-12 RX ADMIN — KETOROLAC TROMETHAMINE 30 MG: 30 INJECTION, SOLUTION INTRAMUSCULAR; INTRAVENOUS at 10:50:00

## 2021-08-12 RX ADMIN — EPHEDRINE SULFATE 10 MG: 50 INJECTION INTRAVENOUS at 10:52:00

## 2021-08-12 NOTE — ANESTHESIA PROCEDURE NOTES
Airway  Date/Time: 8/12/2021 10:59 AM  Urgency: elective      General Information and Staff    Patient location during procedure: OR  Anesthesiologist: Lelo Mark MD  Performed: anesthesiologist     Indications and Patient Condition  Indications for

## 2021-08-12 NOTE — ANESTHESIA POSTPROCEDURE EVALUATION
[facility]    [place] Patient Status:  Hospital Outpatient Surgery   Age/Gender 28year old female MRN [id number]   Location [address] [facility] Attending Jolie Nieto MD   Hosp Day # 0 PCP Jassi Hermosillo DO

## 2021-08-12 NOTE — ANESTHESIA PREPROCEDURE EVALUATION
PRE-OP EVALUATION    Patient Name: Jaden Mclaughlin    Admit Diagnosis: Vulvar cyst [N90.7]    Pre-op Diagnosis: Vulvar cyst [N90.7]    excision of left vulvar sebaceous cyst    Anesthesia Procedure: excision of left vulvar sebaceous cyst (Left Vagina ) regular  Rate: normal     Dental    No notable dental history.          Pulmonary    Pulmonary exam normal.                 Other findings            ASA: 1   Plan: general  NPO status verified and         Comment: Risks of general anesthesia were reviewed

## 2021-08-12 NOTE — OPERATIVE REPORT
Pre-Operative Diagnosis: Left vulvar sebaceous cyst    Post-Operative Diagnosis: same    Procedure Performed: Excision left vulvar sebaceous cyst     Surgeon(s) and Role:     Francis Arauz MD - Primary     Assistant(s):  NA     Surgical Findings: 1 cm l

## 2021-08-12 NOTE — INTERVAL H&P NOTE
Pre-op Diagnosis: Vulvar cyst [N90.7]    The above referenced H&P was reviewed by Remigio Angel MD on 8/12/2021, the patient was examined and no significant changes have occurred in the patient's condition since the H&P was performed.   I discussed with the

## 2021-08-27 ENCOUNTER — OFFICE VISIT (OUTPATIENT)
Dept: OBGYN CLINIC | Facility: CLINIC | Age: 31
End: 2021-08-27

## 2021-08-27 VITALS
WEIGHT: 148 LBS | HEIGHT: 63 IN | SYSTOLIC BLOOD PRESSURE: 110 MMHG | BODY MASS INDEX: 26.22 KG/M2 | DIASTOLIC BLOOD PRESSURE: 72 MMHG

## 2021-08-27 DIAGNOSIS — Z98.890 POST-OPERATIVE STATE: Primary | ICD-10-CM

## 2021-08-27 PROCEDURE — 99024 POSTOP FOLLOW-UP VISIT: CPT | Performed by: OBSTETRICS & GYNECOLOGY

## 2021-08-27 PROCEDURE — 3008F BODY MASS INDEX DOCD: CPT | Performed by: OBSTETRICS & GYNECOLOGY

## 2021-08-27 PROCEDURE — 3078F DIAST BP <80 MM HG: CPT | Performed by: OBSTETRICS & GYNECOLOGY

## 2021-08-27 PROCEDURE — 3074F SYST BP LT 130 MM HG: CPT | Performed by: OBSTETRICS & GYNECOLOGY

## 2021-08-27 NOTE — PROGRESS NOTES
Subjective:  Patient presents with:  Post-Op: removal of vulvar abcess    Patient presents 2 weeks status post excision of left vulvar sebaceous cyst .  Currently without complaints. No pain. Denies fever or discharge.     Objective:  Physical Examination

## 2021-09-09 ENCOUNTER — PATIENT MESSAGE (OUTPATIENT)
Dept: FAMILY MEDICINE CLINIC | Facility: CLINIC | Age: 31
End: 2021-09-09

## 2021-09-09 NOTE — TELEPHONE ENCOUNTER
From: Hoang Rico  To: Sussy Caballero DO  Sent: 9/9/2021 4:43 PM CDT  Subject: Other    Hi,    Since I am still nursing, I was wondering if it is safe to get vaccinated.  All workers are being required, and it is making me very nervous as we have

## 2021-10-05 ENCOUNTER — OFFICE VISIT (OUTPATIENT)
Dept: FAMILY MEDICINE CLINIC | Facility: CLINIC | Age: 31
End: 2021-10-05

## 2021-10-05 VITALS
OXYGEN SATURATION: 100 % | BODY MASS INDEX: 25.52 KG/M2 | HEART RATE: 82 BPM | HEIGHT: 63 IN | RESPIRATION RATE: 18 BRPM | SYSTOLIC BLOOD PRESSURE: 102 MMHG | WEIGHT: 144 LBS | TEMPERATURE: 98 F | DIASTOLIC BLOOD PRESSURE: 62 MMHG

## 2021-10-05 DIAGNOSIS — J30.2 SEASONAL ALLERGIC RHINITIS, UNSPECIFIED TRIGGER: ICD-10-CM

## 2021-10-05 DIAGNOSIS — H10.13 ALLERGIC CONJUNCTIVITIS OF BOTH EYES: Primary | ICD-10-CM

## 2021-10-05 PROCEDURE — 99213 OFFICE O/P EST LOW 20 MIN: CPT | Performed by: FAMILY MEDICINE

## 2021-10-05 PROCEDURE — 3078F DIAST BP <80 MM HG: CPT | Performed by: FAMILY MEDICINE

## 2021-10-05 PROCEDURE — 3008F BODY MASS INDEX DOCD: CPT | Performed by: FAMILY MEDICINE

## 2021-10-05 PROCEDURE — 3074F SYST BP LT 130 MM HG: CPT | Performed by: FAMILY MEDICINE

## 2021-10-05 RX ORDER — OLOPATADINE HYDROCHLORIDE 2 MG/ML
SOLUTION/ DROPS OPHTHALMIC
Qty: 2.5 ML | Refills: 1 | Status: SHIPPED | OUTPATIENT
Start: 2021-10-05

## 2021-10-05 RX ORDER — AZELASTINE 1 MG/ML
1 SPRAY, METERED NASAL 2 TIMES DAILY
Qty: 30 ML | Refills: 0 | Status: SHIPPED | OUTPATIENT
Start: 2021-10-05

## 2021-10-05 NOTE — PATIENT INSTRUCTIONS
Use the Pataday eye drops 1 drop to each eye once daily as needed for itching or swelling. Avoid rubbing your eyes. No contacts until symptoms have improved.     Use the Azelastine nasal spray one spray to each nostril twice daily as needed for nasal conges may use acetaminophen or ibuprofen to control pain, unless another medicine was prescribed. Talk with your healthcare provider if you have chronic liver or kidney disease before using these medicines.  Also talk with your provider if you have ever had a sto nasal sprays, or eye drops. Ask your provider for advice on how to stay away from substances that you are allergic to. Below are a few tips for each type of allergen. Pet dander:  · Do not have pets with fur and feathers.   · If you have a pet, keep it tightness  · Dizziness or lightheadedness  · Feeling of doom  · Stomach pain, bloating, vomiting, or diarrhea  [employer] last reviewed this educational content on 10/1/2019  © 9988-5781 [employer]. All rights reserved.  This information is not

## 2021-10-05 NOTE — PROGRESS NOTES
Jaden Mclaughlin is a 28year old female. HPI:   Patient presents with:  Eye Problem: x1 week, itchy and redness, left eye      This 71-year-old female presents to the office with a 1 week history of bilateral eye itching.   She states the left eye is spray by Nasal route 2 (two) times daily. 30 mL 0   • acetaminophen 500 MG Oral Tab Take 1,000 mg by mouth one time. • Prenatal Vit-Fe Fumarate-FA ( PRENATAL VITAMINS) 28-0.8 MG Oral Tab Take by mouth daily.            Allergies:    Augmentin [Amoxici ASSESSMENT/PLAN:   28year old female with        1. Allergic conjunctivitis of both eyes    I reassured the patient that I see no evidence for pinkeye and there is no evidence for corneal abrasion. I believe her symptoms are due to allergies.   Symptoma

## 2022-07-26 ENCOUNTER — ULTRASOUND ENCOUNTER (OUTPATIENT)
Dept: OBGYN CLINIC | Facility: CLINIC | Age: 32
End: 2022-07-26
Payer: COMMERCIAL

## 2022-07-26 ENCOUNTER — OFFICE VISIT (OUTPATIENT)
Dept: OBGYN CLINIC | Facility: CLINIC | Age: 32
End: 2022-07-26
Payer: COMMERCIAL

## 2022-07-26 VITALS
SYSTOLIC BLOOD PRESSURE: 108 MMHG | HEIGHT: 63 IN | WEIGHT: 154.63 LBS | BODY MASS INDEX: 27.4 KG/M2 | HEART RATE: 96 BPM | DIASTOLIC BLOOD PRESSURE: 68 MMHG

## 2022-07-26 DIAGNOSIS — N91.1 SECONDARY AMENORRHEA: Primary | ICD-10-CM

## 2022-07-26 PROCEDURE — 3008F BODY MASS INDEX DOCD: CPT | Performed by: OBSTETRICS & GYNECOLOGY

## 2022-07-26 PROCEDURE — 3074F SYST BP LT 130 MM HG: CPT | Performed by: OBSTETRICS & GYNECOLOGY

## 2022-07-26 PROCEDURE — 3078F DIAST BP <80 MM HG: CPT | Performed by: OBSTETRICS & GYNECOLOGY

## 2022-07-26 PROCEDURE — 76856 US EXAM PELVIC COMPLETE: CPT | Performed by: OBSTETRICS & GYNECOLOGY

## 2022-07-26 PROCEDURE — 99213 OFFICE O/P EST LOW 20 MIN: CPT | Performed by: OBSTETRICS & GYNECOLOGY

## 2022-09-01 ENCOUNTER — INITIAL PRENATAL (OUTPATIENT)
Dept: OBGYN CLINIC | Facility: CLINIC | Age: 32
End: 2022-09-01
Payer: COMMERCIAL

## 2022-09-01 VITALS
BODY MASS INDEX: 27.96 KG/M2 | DIASTOLIC BLOOD PRESSURE: 50 MMHG | HEIGHT: 63 IN | WEIGHT: 157.81 LBS | SYSTOLIC BLOOD PRESSURE: 102 MMHG

## 2022-09-01 DIAGNOSIS — Z34.80 PRENATAL CARE OF MULTIGRAVIDA, ANTEPARTUM: Primary | ICD-10-CM

## 2022-09-01 DIAGNOSIS — N91.1 SECONDARY AMENORRHEA: ICD-10-CM

## 2022-09-01 LAB
GLUCOSE (URINE DIPSTICK): NEGATIVE MG/DL
PROTEIN (URINE DIPSTICK): NEGATIVE MG/DL

## 2022-09-01 PROCEDURE — 87086 URINE CULTURE/COLONY COUNT: CPT | Performed by: OBSTETRICS & GYNECOLOGY

## 2022-09-03 ENCOUNTER — LAB ENCOUNTER (OUTPATIENT)
Dept: LAB | Age: 32
End: 2022-09-03
Attending: OBSTETRICS & GYNECOLOGY
Payer: COMMERCIAL

## 2022-09-03 DIAGNOSIS — Z34.80 PRENATAL CARE OF MULTIGRAVIDA, ANTEPARTUM: ICD-10-CM

## 2022-09-03 DIAGNOSIS — N91.1 SECONDARY AMENORRHEA: ICD-10-CM

## 2022-09-03 PROBLEM — L72.3 SEBACEOUS CYST: Status: RESOLVED | Noted: 2021-07-02 | Resolved: 2022-09-03

## 2022-09-03 LAB
ANTIBODY SCREEN: NEGATIVE
BASOPHILS # BLD AUTO: 0.04 X10(3) UL (ref 0–0.2)
BASOPHILS NFR BLD AUTO: 0.6 %
EOSINOPHIL # BLD AUTO: 0.25 X10(3) UL (ref 0–0.7)
EOSINOPHIL NFR BLD AUTO: 3.7 %
ERYTHROCYTE [DISTWIDTH] IN BLOOD BY AUTOMATED COUNT: 13 %
HBV SURFACE AG SER-ACNC: 0.18 [IU]/L
HBV SURFACE AG SERPL QL IA: NONREACTIVE
HCT VFR BLD AUTO: 36.2 %
HCV AB SERPL QL IA: NONREACTIVE
HGB BLD-MCNC: 11.9 G/DL
IMM GRANULOCYTES # BLD AUTO: 0.05 X10(3) UL (ref 0–1)
IMM GRANULOCYTES NFR BLD: 0.7 %
LYMPHOCYTES # BLD AUTO: 1.25 X10(3) UL (ref 1–4)
LYMPHOCYTES NFR BLD AUTO: 18.3 %
MCH RBC QN AUTO: 30.9 PG (ref 26–34)
MCHC RBC AUTO-ENTMCNC: 32.9 G/DL (ref 31–37)
MCV RBC AUTO: 94 FL
MONOCYTES # BLD AUTO: 0.4 X10(3) UL (ref 0.1–1)
MONOCYTES NFR BLD AUTO: 5.9 %
NEUTROPHILS # BLD AUTO: 4.83 X10 (3) UL (ref 1.5–7.7)
NEUTROPHILS # BLD AUTO: 4.83 X10(3) UL (ref 1.5–7.7)
NEUTROPHILS NFR BLD AUTO: 70.8 %
PLATELET # BLD AUTO: 207 10(3)UL (ref 150–450)
RBC # BLD AUTO: 3.85 X10(6)UL
RH BLOOD TYPE: POSITIVE
RUBV IGG SER QL: POSITIVE
RUBV IGG SER-ACNC: 59.9 IU/ML (ref 10–?)
T PALLIDUM AB SER QL IA: NONREACTIVE
TSI SER-ACNC: 1.36 MIU/ML (ref 0.36–3.74)
WBC # BLD AUTO: 6.8 X10(3) UL (ref 4–11)

## 2022-09-03 PROCEDURE — 87389 HIV-1 AG W/HIV-1&-2 AB AG IA: CPT

## 2022-09-03 PROCEDURE — 86900 BLOOD TYPING SEROLOGIC ABO: CPT

## 2022-09-03 PROCEDURE — 86762 RUBELLA ANTIBODY: CPT

## 2022-09-03 PROCEDURE — 85025 COMPLETE CBC W/AUTO DIFF WBC: CPT

## 2022-09-03 PROCEDURE — 86850 RBC ANTIBODY SCREEN: CPT

## 2022-09-03 PROCEDURE — 86901 BLOOD TYPING SEROLOGIC RH(D): CPT

## 2022-09-03 PROCEDURE — 36415 COLL VENOUS BLD VENIPUNCTURE: CPT

## 2022-09-03 PROCEDURE — 87340 HEPATITIS B SURFACE AG IA: CPT

## 2022-09-03 PROCEDURE — 84443 ASSAY THYROID STIM HORMONE: CPT

## 2022-09-03 PROCEDURE — 86803 HEPATITIS C AB TEST: CPT

## 2022-09-03 PROCEDURE — 86780 TREPONEMA PALLIDUM: CPT

## 2022-09-26 ENCOUNTER — ROUTINE PRENATAL (OUTPATIENT)
Dept: OBGYN CLINIC | Facility: CLINIC | Age: 32
End: 2022-09-26

## 2022-09-26 VITALS — WEIGHT: 158.13 LBS | SYSTOLIC BLOOD PRESSURE: 112 MMHG | DIASTOLIC BLOOD PRESSURE: 68 MMHG | BODY MASS INDEX: 28 KG/M2

## 2022-09-26 DIAGNOSIS — Z3A.17 17 WEEKS GESTATION OF PREGNANCY: Primary | ICD-10-CM

## 2022-09-26 NOTE — PROGRESS NOTES
17w2d seen for routine OB visit. Denies ctx, lof, vb. Reports no FM yet. C/o allergies, non-pruritic rash on legs. Patient Active Problem List:     Multiple drug allergies     Prenatal care of multigravida, antepartum       Gen: AAOx3, NAD  Resp: breathing comfortably  Abdomen: gravid, soft, nontender  Ext: nontender, no edema    Plan:  - anatomy US 10/20  - declined genetic screening  - return precautions reviewed    RTO in 4 week(s).

## 2022-10-20 ENCOUNTER — ROUTINE PRENATAL (OUTPATIENT)
Dept: OBGYN CLINIC | Facility: CLINIC | Age: 32
End: 2022-10-20
Payer: COMMERCIAL

## 2022-10-20 ENCOUNTER — ULTRASOUND ENCOUNTER (OUTPATIENT)
Dept: OBGYN CLINIC | Facility: CLINIC | Age: 32
End: 2022-10-20
Payer: COMMERCIAL

## 2022-10-20 VITALS — SYSTOLIC BLOOD PRESSURE: 110 MMHG | BODY MASS INDEX: 29 KG/M2 | DIASTOLIC BLOOD PRESSURE: 70 MMHG | WEIGHT: 162.38 LBS

## 2022-10-20 DIAGNOSIS — Z34.80 PRENATAL CARE OF MULTIGRAVIDA, ANTEPARTUM: ICD-10-CM

## 2022-10-20 DIAGNOSIS — Z34.80 SUPERVISION OF OTHER NORMAL PREGNANCY, ANTEPARTUM: Primary | ICD-10-CM

## 2022-10-20 LAB
GLUCOSE (URINE DIPSTICK): NEGATIVE MG/DL
MULTISTIX LOT#: NORMAL NUMERIC

## 2022-10-20 PROCEDURE — 3078F DIAST BP <80 MM HG: CPT | Performed by: OBSTETRICS & GYNECOLOGY

## 2022-10-20 PROCEDURE — 3074F SYST BP LT 130 MM HG: CPT | Performed by: OBSTETRICS & GYNECOLOGY

## 2022-10-20 PROCEDURE — 76805 OB US >/= 14 WKS SNGL FETUS: CPT | Performed by: OBSTETRICS & GYNECOLOGY

## 2022-10-20 PROCEDURE — 81002 URINALYSIS NONAUTO W/O SCOPE: CPT | Performed by: OBSTETRICS & GYNECOLOGY

## 2022-10-20 NOTE — PROGRESS NOTES
MICHAEL    GA: 20w5d   10/20/22  1734   BP: 110/70   Weight: 162 lb 6.4 oz (73.7 kg)       Doing well  No complaints. Denies LOF/VB/uctx  RH +  Genetic testing declined   Anatomy Scan 10/20 unremarkable   CBC and 1 hr GTT order and instructions provided    Problem List Items Addressed This Visit        Obstetrical    Prenatal care of multigravida, antepartum    Relevant Orders    URINALYSIS NONAUTO W/O SCOPE (OB URISTIX) (Completed)      Other Visit Diagnoses     Supervision of other normal pregnancy, antepartum    -  Primary    Relevant Orders    US OB COMPLETE 2ND TRIMESTER >14 WKS [facility] ONLY [address] (Completed)    CBC (WITH DIFF, PLATELET) REFLEX TO FERRITIN    GLUCOSE 1HR OB            RTC in 4 wks         Note to patient and family   The [employer] [facility] makes medical notes available to patients in the interest of transparency. However, please be advised that this is a medical document. It is intended as [id number] communication. It is written and medical language may contain abbreviations or verbiage that are technical and unfamiliar. It may appear blunt or direct. Medical documents are intended to carry relevant information, facts as evident, and the clinical opinion of the practitioner.

## 2022-11-21 ENCOUNTER — ROUTINE PRENATAL (OUTPATIENT)
Dept: OBGYN CLINIC | Facility: CLINIC | Age: 32
End: 2022-11-21
Payer: COMMERCIAL

## 2022-11-21 VITALS
SYSTOLIC BLOOD PRESSURE: 110 MMHG | HEART RATE: 113 BPM | DIASTOLIC BLOOD PRESSURE: 70 MMHG | WEIGHT: 164.38 LBS | HEIGHT: 63 IN | BODY MASS INDEX: 29.12 KG/M2

## 2022-11-21 DIAGNOSIS — Z3A.25 25 WEEKS GESTATION OF PREGNANCY: Primary | ICD-10-CM

## 2022-11-21 LAB
GLUCOSE (URINE DIPSTICK): NEGATIVE MG/DL
MULTISTIX LOT#: NORMAL NUMERIC

## 2022-11-21 NOTE — PROGRESS NOTES
Patient presents with: Other: MICHAEL    Routine prenatal visit. Patient without complaints. Good fetal movement  Patient denies any bleeding, leaking fluid, cramping, or contractions. Assessment/Plan:  25w2d doing well  1 hour GTT, CBC ordered. Depression screen reviewed. Blood type O pos  Reviewed vaccine recommendations: Tdap, influenza offered. Reviewed  labor signs and symptoms. Diagnoses and all orders for this visit:    25 weeks gestation of pregnancy  -     URINALYSIS NONAUTO W/O SCOPE (OB URISTIX)      Return in about 3 weeks (around 2022) for Routine Prenatal Visit and HIV, Glucola Next Available.

## 2022-11-22 ENCOUNTER — LAB ENCOUNTER (OUTPATIENT)
Dept: LAB | Age: 32
End: 2022-11-22
Attending: OBSTETRICS & GYNECOLOGY
Payer: COMMERCIAL

## 2022-11-22 DIAGNOSIS — Z34.80 SUPERVISION OF OTHER NORMAL PREGNANCY, ANTEPARTUM: ICD-10-CM

## 2022-11-22 LAB
BASOPHILS # BLD AUTO: 0.03 X10(3) UL (ref 0–0.2)
BASOPHILS NFR BLD AUTO: 0.5 %
EOSINOPHIL # BLD AUTO: 0.31 X10(3) UL (ref 0–0.7)
EOSINOPHIL NFR BLD AUTO: 5.4 %
ERYTHROCYTE [DISTWIDTH] IN BLOOD BY AUTOMATED COUNT: 13.5 %
GLUCOSE 1H P GLC SERPL-MCNC: 128 MG/DL
HCT VFR BLD AUTO: 35.9 %
HGB BLD-MCNC: 11.8 G/DL
IMM GRANULOCYTES # BLD AUTO: 0.16 X10(3) UL (ref 0–1)
IMM GRANULOCYTES NFR BLD: 2.8 %
LYMPHOCYTES # BLD AUTO: 1.26 X10(3) UL (ref 1–4)
LYMPHOCYTES NFR BLD AUTO: 22.1 %
MCH RBC QN AUTO: 31.6 PG (ref 26–34)
MCHC RBC AUTO-ENTMCNC: 32.9 G/DL (ref 31–37)
MCV RBC AUTO: 96 FL
MONOCYTES # BLD AUTO: 0.47 X10(3) UL (ref 0.1–1)
MONOCYTES NFR BLD AUTO: 8.2 %
NEUTROPHILS # BLD AUTO: 3.47 X10 (3) UL (ref 1.5–7.7)
NEUTROPHILS # BLD AUTO: 3.47 X10(3) UL (ref 1.5–7.7)
NEUTROPHILS NFR BLD AUTO: 61 %
PLATELET # BLD AUTO: 169 10(3)UL (ref 150–450)
RBC # BLD AUTO: 3.74 X10(6)UL
WBC # BLD AUTO: 5.7 X10(3) UL (ref 4–11)

## 2022-11-22 PROCEDURE — 36415 COLL VENOUS BLD VENIPUNCTURE: CPT

## 2022-11-22 PROCEDURE — 85025 COMPLETE CBC W/AUTO DIFF WBC: CPT

## 2022-11-22 PROCEDURE — 82950 GLUCOSE TEST: CPT

## 2022-12-15 ENCOUNTER — TELEPHONE (OUTPATIENT)
Dept: OBGYN CLINIC | Facility: CLINIC | Age: 32
End: 2022-12-15

## 2022-12-15 ENCOUNTER — ROUTINE PRENATAL (OUTPATIENT)
Dept: OBGYN CLINIC | Facility: CLINIC | Age: 32
End: 2022-12-15
Payer: COMMERCIAL

## 2022-12-15 VITALS
BODY MASS INDEX: 29.73 KG/M2 | SYSTOLIC BLOOD PRESSURE: 112 MMHG | HEIGHT: 63 IN | WEIGHT: 167.81 LBS | DIASTOLIC BLOOD PRESSURE: 70 MMHG

## 2022-12-15 DIAGNOSIS — Z23 NEED FOR VACCINATION: ICD-10-CM

## 2022-12-15 DIAGNOSIS — Z36.9 ANTENATAL SCREENING ENCOUNTER: ICD-10-CM

## 2022-12-15 DIAGNOSIS — Z34.80 PRENATAL CARE OF MULTIGRAVIDA, ANTEPARTUM: Primary | ICD-10-CM

## 2022-12-15 PROCEDURE — 3078F DIAST BP <80 MM HG: CPT | Performed by: NURSE PRACTITIONER

## 2022-12-15 PROCEDURE — 3008F BODY MASS INDEX DOCD: CPT | Performed by: NURSE PRACTITIONER

## 2022-12-15 PROCEDURE — 90715 TDAP VACCINE 7 YRS/> IM: CPT | Performed by: NURSE PRACTITIONER

## 2022-12-15 PROCEDURE — 81002 URINALYSIS NONAUTO W/O SCOPE: CPT | Performed by: NURSE PRACTITIONER

## 2022-12-15 PROCEDURE — 90471 IMMUNIZATION ADMIN: CPT | Performed by: NURSE PRACTITIONER

## 2022-12-15 PROCEDURE — 3074F SYST BP LT 130 MM HG: CPT | Performed by: NURSE PRACTITIONER

## 2022-12-15 NOTE — PROGRESS NOTES
Patient presents with:  Prenatal Care: Jerald     Routine prenatal visit. Patient without complaints. Good fetal movement  Patient denies any bleeding, leaking fluid, cramping, or contractions. Assessment/Plan:  28w5d doing well  1 hour GTT, CBC done. HIV ordered. Blood type O positive  Reviewed vaccine recommendations: Tdap today  Kick count information given. Reviewed  labor signs and symptoms. Diagnoses and all orders for this visit:    Prenatal care of multigravida, antepartum  -     URINALYSIS NONAUTO W/O SCOPE (OB URISTIX)    Need for vaccination  -     TETANUS, DIPHTHERIA TOXOIDS AND ACELLULAR PERTUSIS VACCINE (TDAP), >7 YEARS, IM USE     screening encounter  -     HIV AG AB COMBO; Future      Return in about 2 weeks (around 2022) for JERALD.

## 2022-12-15 NOTE — TELEPHONE ENCOUNTER
Pt dropped off [facility] paperwork to be completed. Pt paid form fee. No rush on paperwork.     Placed on [email or web address] bin in [address]

## 2022-12-20 ENCOUNTER — MED REC SCAN ONLY (OUTPATIENT)
Dept: OBGYN CLINIC | Facility: CLINIC | Age: 32
End: 2022-12-20

## 2022-12-27 ENCOUNTER — LAB ENCOUNTER (OUTPATIENT)
Dept: LAB | Age: 32
End: 2022-12-27
Attending: NURSE PRACTITIONER
Payer: COMMERCIAL

## 2022-12-27 DIAGNOSIS — Z36.9 ANTENATAL SCREENING ENCOUNTER: ICD-10-CM

## 2022-12-27 PROCEDURE — 36415 COLL VENOUS BLD VENIPUNCTURE: CPT

## 2022-12-27 PROCEDURE — 87389 HIV-1 AG W/HIV-1&-2 AB AG IA: CPT

## 2022-12-29 ENCOUNTER — ROUTINE PRENATAL (OUTPATIENT)
Dept: OBGYN CLINIC | Facility: CLINIC | Age: 32
End: 2022-12-29
Payer: COMMERCIAL

## 2022-12-29 VITALS
DIASTOLIC BLOOD PRESSURE: 70 MMHG | BODY MASS INDEX: 29.91 KG/M2 | SYSTOLIC BLOOD PRESSURE: 110 MMHG | WEIGHT: 168.81 LBS | HEIGHT: 63 IN

## 2022-12-29 DIAGNOSIS — Z34.80 PRENATAL CARE OF MULTIGRAVIDA, ANTEPARTUM: Primary | ICD-10-CM

## 2022-12-29 LAB
GLUCOSE (URINE DIPSTICK): NEGATIVE MG/DL
MULTISTIX LOT#: NORMAL NUMERIC

## 2022-12-29 PROCEDURE — 3008F BODY MASS INDEX DOCD: CPT | Performed by: OBSTETRICS & GYNECOLOGY

## 2022-12-29 PROCEDURE — 3078F DIAST BP <80 MM HG: CPT | Performed by: OBSTETRICS & GYNECOLOGY

## 2022-12-29 PROCEDURE — 81002 URINALYSIS NONAUTO W/O SCOPE: CPT | Performed by: OBSTETRICS & GYNECOLOGY

## 2022-12-29 PROCEDURE — 3074F SYST BP LT 130 MM HG: CPT | Performed by: OBSTETRICS & GYNECOLOGY

## 2022-12-29 NOTE — PROGRESS NOTES
Patient presents with:  Prenatal Care: MICHAEL - pt c/o of some right abn pain after last visit - but has subsided. Routine prenatal visit without complaints. Patient denies any bleeding, leaking fluid, cramping, or regular uterine contractions. Good fetal movement. Assessment/Plan:  30w5d doing well  Kick counts reminded  Reviewed  labor signs and symptoms. Reviewed vaccine recommendations: Tdap done  Diagnoses and all orders for this visit:    Prenatal care of multigravida, antepartum  -     URINALYSIS NONAUTO W/O SCOPE (OB URISTIX)       Return in about 2 weeks (around 2023) for Routine Prenatal Visit.

## 2023-01-03 ENCOUNTER — TELEPHONE (OUTPATIENT)
Dept: OBGYN CLINIC | Facility: CLINIC | Age: 33
End: 2023-01-03

## 2023-01-03 NOTE — TELEPHONE ENCOUNTER
MRN:[id number]                      After Visit Summary   6/13/2018    Elly Ballard    MRN: [id number]           Visit Information        Provider Department      6/13/2018 4:30 PM [facility]      Your next 10 appointments already scheduled     Jun 26, 2018 11:00 AM CDT   Return Visit with TRACY Fritz   [facility] ([facility])    [facility]  [address] [place], [place] [id number]  [facility] [address]   [phone]              MyChart Information     [employer] gives you secure access to your electronic health record. If you see a primary care provider, you can also send messages to your care team and make appointments. If you have questions, please call your primary care clinic.  If you do not have a primary care provider, please call [phone] and they will assist you.        Care EveryWhere ID     This is your Care EveryWhere ID. This could be used by other organizations to access your [place] medical records  [id number]        Equal Access to Services     VIC COTTRELL AH: Hadii wild lestero Sobetsy, waaxda luqadaha, qaybta kaalmada adeegyada, marino sow. So [facility] [phone].    ATENCIÓN: Si habla español, tiene a funk disposición servicios gratuitos de asistencia lingüística. Emilyame al [phone].    We comply with applicable federal civil rights laws and [place] laws. We do not discriminate on the basis of race, color, national origin, age, disability, sex, sexual orientation, or gender identity.             G2/P 1 GA 31 3/7 wks patient complaining of intermittent right sided upper abdominal pain. She feels a hard spot in this area. Only feels pain when there is pressure on the area.  Feels it is worse than when she was in the office last.  Last OV: 12/29/22 tierney with Dr. Bee Kilgore   Pregnancy Complications: none  Abdominal pain: denies  Leaking of fluid: denies  Vaginal Bleeding: denies  Fetal Movement: +  Recommendations: appt scheduled for exam.

## 2023-01-04 ENCOUNTER — ROUTINE PRENATAL (OUTPATIENT)
Dept: OBGYN CLINIC | Facility: CLINIC | Age: 33
End: 2023-01-04
Payer: COMMERCIAL

## 2023-01-04 VITALS
DIASTOLIC BLOOD PRESSURE: 62 MMHG | SYSTOLIC BLOOD PRESSURE: 108 MMHG | HEIGHT: 63 IN | BODY MASS INDEX: 30.05 KG/M2 | WEIGHT: 169.63 LBS

## 2023-01-04 DIAGNOSIS — Z34.80 PRENATAL CARE OF MULTIGRAVIDA, ANTEPARTUM: Primary | ICD-10-CM

## 2023-01-04 LAB
GLUCOSE (URINE DIPSTICK): NEGATIVE MG/DL
MULTISTIX LOT#: NORMAL NUMERIC

## 2023-01-04 PROCEDURE — 3008F BODY MASS INDEX DOCD: CPT | Performed by: OBSTETRICS & GYNECOLOGY

## 2023-01-04 PROCEDURE — 3078F DIAST BP <80 MM HG: CPT | Performed by: OBSTETRICS & GYNECOLOGY

## 2023-01-04 PROCEDURE — 3074F SYST BP LT 130 MM HG: CPT | Performed by: OBSTETRICS & GYNECOLOGY

## 2023-01-04 PROCEDURE — 81002 URINALYSIS NONAUTO W/O SCOPE: CPT | Performed by: OBSTETRICS & GYNECOLOGY

## 2023-01-04 NOTE — PROGRESS NOTES
Patient presents with:  Pregnancy Issues: Pt c/o of left side abd pain since 12/15 - inconsistently. Started again more consistently on Monday - when applying pressure feels sore/tender. Routine prenatal visit, complaining of mild pain to right and left of midline in upper abdomen, not near costal margin. No related to meals. Did have bad heartburn recently. Patient denies any bleeding, leaking fluid, cramping, or regular uterine contractions. Good fetal movement. Assessment/Plan:  31w4d doing well  Kick counts reminded  Reviewed  labor signs and symptoms. Reviewed vaccine recommendations: Tdap done. Diagnoses and all orders for this visit:    Prenatal care of multigravida, antepartum  -     URINALYSIS NONAUTO W/O SCOPE (OB URISTIX)       Return in about 2 weeks (around 2023) for Routine Prenatal Visit.

## 2023-01-18 ENCOUNTER — ROUTINE PRENATAL (OUTPATIENT)
Dept: OBGYN CLINIC | Facility: CLINIC | Age: 33
End: 2023-01-18
Payer: COMMERCIAL

## 2023-01-18 VITALS
DIASTOLIC BLOOD PRESSURE: 70 MMHG | SYSTOLIC BLOOD PRESSURE: 110 MMHG | WEIGHT: 173 LBS | HEIGHT: 63 IN | BODY MASS INDEX: 30.65 KG/M2

## 2023-01-18 DIAGNOSIS — Z34.80 SUPERVISION OF OTHER NORMAL PREGNANCY, ANTEPARTUM: ICD-10-CM

## 2023-01-18 DIAGNOSIS — Z36.9 PRENATAL SCREENING ENCOUNTER: Primary | ICD-10-CM

## 2023-01-18 PROCEDURE — 3074F SYST BP LT 130 MM HG: CPT | Performed by: NURSE PRACTITIONER

## 2023-01-18 PROCEDURE — 81002 URINALYSIS NONAUTO W/O SCOPE: CPT | Performed by: NURSE PRACTITIONER

## 2023-01-18 PROCEDURE — 3078F DIAST BP <80 MM HG: CPT | Performed by: NURSE PRACTITIONER

## 2023-01-18 PROCEDURE — 3008F BODY MASS INDEX DOCD: CPT | Performed by: NURSE PRACTITIONER

## 2023-01-18 NOTE — PROGRESS NOTES
MICHAEL  Doing well, no immediate concerns or questions  GOOD FM  Denies VB/LOF/uctx  RTC in 2 wks  Fetal movement discussed

## 2023-01-31 ENCOUNTER — ROUTINE PRENATAL (OUTPATIENT)
Dept: OBGYN CLINIC | Facility: CLINIC | Age: 33
End: 2023-01-31
Payer: COMMERCIAL

## 2023-01-31 VITALS
HEART RATE: 112 BPM | SYSTOLIC BLOOD PRESSURE: 118 MMHG | DIASTOLIC BLOOD PRESSURE: 80 MMHG | WEIGHT: 175.81 LBS | BODY MASS INDEX: 31 KG/M2

## 2023-01-31 DIAGNOSIS — Z3A.35 35 WEEKS GESTATION OF PREGNANCY: Primary | ICD-10-CM

## 2023-01-31 PROCEDURE — 81002 URINALYSIS NONAUTO W/O SCOPE: CPT | Performed by: NURSE PRACTITIONER

## 2023-01-31 PROCEDURE — 3074F SYST BP LT 130 MM HG: CPT | Performed by: NURSE PRACTITIONER

## 2023-01-31 PROCEDURE — 3079F DIAST BP 80-89 MM HG: CPT | Performed by: NURSE PRACTITIONER

## 2023-01-31 NOTE — PROGRESS NOTES
MICHAEL 35w3d    +FM  Denies contractions  Denies LOF  Pt also notes that she sometimes gets a spot of blood on her panty liner. Usually it is towards the sides and feels that this could be due to some pimples. Today was in the center of the pad. Not more than a quarter size. No recent intercourse      1. FHT-P  2. PNL:  HIV negative  3. Mode of delivery: anticipate   4. Immunizations: s/p TDAP  5. Vaginal exam: closed cervix, physiologic discharge  6.  labor precautions discussed  7.  Fetal movement expectations reviewed    Return in 1 weeks

## 2023-02-07 ENCOUNTER — ROUTINE PRENATAL (OUTPATIENT)
Dept: OBGYN CLINIC | Facility: CLINIC | Age: 33
End: 2023-02-07
Payer: COMMERCIAL

## 2023-02-07 VITALS — WEIGHT: 176.63 LBS | BODY MASS INDEX: 31 KG/M2 | SYSTOLIC BLOOD PRESSURE: 122 MMHG | DIASTOLIC BLOOD PRESSURE: 70 MMHG

## 2023-02-07 DIAGNOSIS — Z34.80 PRENATAL CARE OF MULTIGRAVIDA, ANTEPARTUM: Primary | ICD-10-CM

## 2023-02-07 DIAGNOSIS — Z36.85 ANTENATAL SCREENING FOR STREPTOCOCCUS B: ICD-10-CM

## 2023-02-07 LAB
GLUCOSE (URINE DIPSTICK): NEGATIVE MG/DL
MULTISTIX LOT#: NORMAL NUMERIC

## 2023-02-07 PROCEDURE — 87653 STREP B DNA AMP PROBE: CPT | Performed by: NURSE PRACTITIONER

## 2023-02-07 PROCEDURE — 3078F DIAST BP <80 MM HG: CPT | Performed by: NURSE PRACTITIONER

## 2023-02-07 PROCEDURE — 87081 CULTURE SCREEN ONLY: CPT | Performed by: NURSE PRACTITIONER

## 2023-02-07 PROCEDURE — 81002 URINALYSIS NONAUTO W/O SCOPE: CPT | Performed by: NURSE PRACTITIONER

## 2023-02-07 PROCEDURE — 3074F SYST BP LT 130 MM HG: CPT | Performed by: NURSE PRACTITIONER

## 2023-02-07 NOTE — PROGRESS NOTES
MICHAEL 36w3d    Doing well, +FM  Denies contractions  Denies LOF, VB      1. FHT-P  2. SVE: 0/0/-3  3. PNL:  GBS today  4. Mode of delivery: anticipate   5. Immunizations: s/p TDAP  6.  labor precautions discussed  7.  Fetal movement expectations reviewed    Return in 1 weeks

## 2023-02-09 LAB — GROUP B STREP BY PCR FOR PCR OVT: NEGATIVE

## 2023-02-13 ENCOUNTER — ROUTINE PRENATAL (OUTPATIENT)
Dept: OBGYN CLINIC | Facility: CLINIC | Age: 33
End: 2023-02-13
Payer: COMMERCIAL

## 2023-02-13 VITALS
WEIGHT: 180 LBS | HEART RATE: 97 BPM | BODY MASS INDEX: 31.89 KG/M2 | HEIGHT: 63 IN | DIASTOLIC BLOOD PRESSURE: 70 MMHG | SYSTOLIC BLOOD PRESSURE: 118 MMHG

## 2023-02-13 DIAGNOSIS — Z36.9 PRENATAL SCREENING ENCOUNTER: Primary | ICD-10-CM

## 2023-02-13 NOTE — PROGRESS NOTES
Patient presents with:  Prenatal Care: MICHAEL    Routine prenatal visit without complaints. Patient denies any bleeding, leaking fluid, cramping, or regular uterine contractions. Good fetal movement. Assessment/Plan:  37w2d doing well  GBS neg  Kick counts reviewed. Reviewed labor signs and symptoms. Diagnoses and all orders for this visit:    Prenatal screening encounter  -     URINALYSIS NONAUTO W/O SCOPE (OB URISTIX)       Return in about 1 week (around 2/20/2023) for Routine Prenatal Visit.

## 2023-02-21 ENCOUNTER — ROUTINE PRENATAL (OUTPATIENT)
Dept: OBGYN CLINIC | Facility: CLINIC | Age: 33
End: 2023-02-21
Payer: COMMERCIAL

## 2023-02-21 VITALS — SYSTOLIC BLOOD PRESSURE: 108 MMHG | WEIGHT: 178.38 LBS | BODY MASS INDEX: 32 KG/M2 | DIASTOLIC BLOOD PRESSURE: 78 MMHG

## 2023-02-21 DIAGNOSIS — Z34.80 SUPERVISION OF OTHER NORMAL PREGNANCY, ANTEPARTUM: Primary | ICD-10-CM

## 2023-02-21 LAB
GLUCOSE (URINE DIPSTICK): NEGATIVE MG/DL
PROTEIN (URINE DIPSTICK): NEGATIVE MG/DL

## 2023-02-21 PROCEDURE — 81002 URINALYSIS NONAUTO W/O SCOPE: CPT | Performed by: OBSTETRICS & GYNECOLOGY

## 2023-02-21 PROCEDURE — 3078F DIAST BP <80 MM HG: CPT | Performed by: OBSTETRICS & GYNECOLOGY

## 2023-02-21 PROCEDURE — 3074F SYST BP LT 130 MM HG: CPT | Performed by: OBSTETRICS & GYNECOLOGY

## 2023-02-21 NOTE — PROGRESS NOTES
MICHAEL 38w2d    Doing ok, +FM  No regular contractions but feeling a lot more pressure and discomfort  no LOF, VB    SVE: 2-3/long/-2      1. FHT-present  2. PNL:  GBS neg  3. Mode of delivery:  anticipated, reviewed labor precautions  4.  Immunizations: s/p TDAP    Return in 1 weeks

## 2023-03-03 ENCOUNTER — ROUTINE PRENATAL (OUTPATIENT)
Dept: OBGYN CLINIC | Facility: CLINIC | Age: 33
End: 2023-03-03
Payer: COMMERCIAL

## 2023-03-03 VITALS
HEART RATE: 108 BPM | WEIGHT: 179 LBS | SYSTOLIC BLOOD PRESSURE: 116 MMHG | DIASTOLIC BLOOD PRESSURE: 70 MMHG | BODY MASS INDEX: 31.71 KG/M2 | HEIGHT: 63 IN

## 2023-03-03 DIAGNOSIS — Z3A.39 39 WEEKS GESTATION OF PREGNANCY: Primary | ICD-10-CM

## 2023-03-03 PROCEDURE — 3008F BODY MASS INDEX DOCD: CPT | Performed by: OBSTETRICS & GYNECOLOGY

## 2023-03-03 PROCEDURE — 3074F SYST BP LT 130 MM HG: CPT | Performed by: OBSTETRICS & GYNECOLOGY

## 2023-03-03 PROCEDURE — 3078F DIAST BP <80 MM HG: CPT | Performed by: OBSTETRICS & GYNECOLOGY

## 2023-03-03 PROCEDURE — 81002 URINALYSIS NONAUTO W/O SCOPE: CPT | Performed by: OBSTETRICS & GYNECOLOGY

## 2023-03-03 NOTE — PROGRESS NOTES
39w6d seen for routine OB visit. Denies ctx, lof, vb. Reports good FM.     Patient Active Problem List:     Multiple drug allergies     Supervision of other normal pregnancy, antepartum       Gen: AAOx3, NAD  Resp: breathing comfortably  Abdomen: gravid, soft, nontender  Ext: nontender, no edema  SVE: /-3    Plan:  - GBS neg  - IOL scheduled 3/6 8am  - return precautions reviewed

## 2023-03-04 ENCOUNTER — ANESTHESIA (OUTPATIENT)
Dept: OBGYN UNIT | Facility: HOSPITAL | Age: 33
End: 2023-03-04
Payer: COMMERCIAL

## 2023-03-04 ENCOUNTER — ANESTHESIA EVENT (OUTPATIENT)
Dept: OBGYN UNIT | Facility: HOSPITAL | Age: 33
End: 2023-03-04
Payer: COMMERCIAL

## 2023-03-04 ENCOUNTER — HOSPITAL ENCOUNTER (INPATIENT)
Facility: HOSPITAL | Age: 33
LOS: 2 days | Discharge: HOME | End: 2023-03-06
Attending: OBSTETRICS & GYNECOLOGY | Admitting: OBSTETRICS & GYNECOLOGY
Payer: COMMERCIAL

## 2023-03-04 PROBLEM — Z34.90 PREGNANCY: Status: ACTIVE | Noted: 2023-03-04

## 2023-03-04 PROBLEM — Z34.90 PREGNANCY (HCC): Status: ACTIVE | Noted: 2023-03-04

## 2023-03-04 LAB
ANTIBODY SCREEN: NEGATIVE
BASOPHILS # BLD AUTO: 0.04 X10(3) UL (ref 0–0.2)
BASOPHILS NFR BLD AUTO: 0.4 %
EOSINOPHIL # BLD AUTO: 0.19 X10(3) UL (ref 0–0.7)
EOSINOPHIL NFR BLD AUTO: 1.9 %
ERYTHROCYTE [DISTWIDTH] IN BLOOD BY AUTOMATED COUNT: 13.5 %
HCT VFR BLD AUTO: 38.2 %
HGB BLD-MCNC: 12.8 G/DL
IMM GRANULOCYTES # BLD AUTO: 0.16 X10(3) UL (ref 0–1)
IMM GRANULOCYTES NFR BLD: 1.6 %
LYMPHOCYTES # BLD AUTO: 1.34 X10(3) UL (ref 1–4)
LYMPHOCYTES NFR BLD AUTO: 13.4 %
MCH RBC QN AUTO: 30.5 PG (ref 26–34)
MCHC RBC AUTO-ENTMCNC: 33.5 G/DL (ref 31–37)
MCV RBC AUTO: 91 FL
MONOCYTES # BLD AUTO: 0.69 X10(3) UL (ref 0.1–1)
MONOCYTES NFR BLD AUTO: 6.9 %
NEUTROPHILS # BLD AUTO: 7.61 X10 (3) UL (ref 1.5–7.7)
NEUTROPHILS # BLD AUTO: 7.61 X10(3) UL (ref 1.5–7.7)
NEUTROPHILS NFR BLD AUTO: 75.8 %
PLATELET # BLD AUTO: 159 10(3)UL (ref 150–450)
RBC # BLD AUTO: 4.2 X10(6)UL
RH BLOOD TYPE: POSITIVE
SARS-COV-2 RNA RESP QL NAA+PROBE: NOT DETECTED
T PALLIDUM AB SER QL IA: NONREACTIVE
WBC # BLD AUTO: 10 X10(3) UL (ref 4–11)

## 2023-03-04 PROCEDURE — 59400 OBSTETRICAL CARE: CPT | Performed by: STUDENT IN AN ORGANIZED HEALTH CARE EDUCATION/TRAINING PROGRAM

## 2023-03-04 PROCEDURE — 10907ZC DRAINAGE OF AMNIOTIC FLUID, THERAPEUTIC FROM PRODUCTS OF CONCEPTION, VIA NATURAL OR ARTIFICIAL OPENING: ICD-10-PCS | Performed by: STUDENT IN AN ORGANIZED HEALTH CARE EDUCATION/TRAINING PROGRAM

## 2023-03-04 PROCEDURE — 0HQ9XZZ REPAIR PERINEUM SKIN, EXTERNAL APPROACH: ICD-10-PCS | Performed by: STUDENT IN AN ORGANIZED HEALTH CARE EDUCATION/TRAINING PROGRAM

## 2023-03-04 RX ORDER — AMMONIA INHALANTS 0.04 G/.3ML
0.3 INHALANT RESPIRATORY (INHALATION) AS NEEDED
Status: DISCONTINUED | OUTPATIENT
Start: 2023-03-04 | End: 2023-03-04

## 2023-03-04 RX ORDER — ACETAMINOPHEN 500 MG
500 TABLET ORAL EVERY 6 HOURS PRN
Status: DISCONTINUED | OUTPATIENT
Start: 2023-03-04 | End: 2023-03-06

## 2023-03-04 RX ORDER — BUPIVACAINE HCL/0.9 % NACL/PF 0.25 %
5 PLASTIC BAG, INJECTION (ML) EPIDURAL AS NEEDED
Status: DISCONTINUED | OUTPATIENT
Start: 2023-03-04 | End: 2023-03-04

## 2023-03-04 RX ORDER — DEXTROSE, SODIUM CHLORIDE, SODIUM LACTATE, POTASSIUM CHLORIDE, AND CALCIUM CHLORIDE 5; .6; .31; .03; .02 G/100ML; G/100ML; G/100ML; G/100ML; G/100ML
INJECTION, SOLUTION INTRAVENOUS CONTINUOUS
Status: DISCONTINUED | OUTPATIENT
Start: 2023-03-04 | End: 2023-03-04

## 2023-03-04 RX ORDER — CHOLECALCIFEROL (VITAMIN D3) 25 MCG
1 TABLET,CHEWABLE ORAL DAILY
Status: DISCONTINUED | OUTPATIENT
Start: 2023-03-04 | End: 2023-03-06

## 2023-03-04 RX ORDER — DOCUSATE SODIUM 100 MG/1
100 CAPSULE, LIQUID FILLED ORAL
Status: DISCONTINUED | OUTPATIENT
Start: 2023-03-05 | End: 2023-03-06

## 2023-03-04 RX ORDER — BISACODYL 10 MG
10 SUPPOSITORY, RECTAL RECTAL ONCE AS NEEDED
Status: DISCONTINUED | OUTPATIENT
Start: 2023-03-04 | End: 2023-03-06

## 2023-03-04 RX ORDER — SIMETHICONE 80 MG
80 TABLET,CHEWABLE ORAL 3 TIMES DAILY PRN
Status: DISCONTINUED | OUTPATIENT
Start: 2023-03-04 | End: 2023-03-06

## 2023-03-04 RX ORDER — SODIUM CHLORIDE, SODIUM LACTATE, POTASSIUM CHLORIDE, CALCIUM CHLORIDE 600; 310; 30; 20 MG/100ML; MG/100ML; MG/100ML; MG/100ML
INJECTION, SOLUTION INTRAVENOUS CONTINUOUS
Status: DISCONTINUED | OUTPATIENT
Start: 2023-03-04 | End: 2023-03-04

## 2023-03-04 RX ORDER — ACETAMINOPHEN 500 MG
1000 TABLET ORAL EVERY 6 HOURS PRN
Status: DISCONTINUED | OUTPATIENT
Start: 2023-03-04 | End: 2023-03-06

## 2023-03-04 RX ORDER — METHYLERGONOVINE MALEATE 0.2 MG/ML
0.2 INJECTION INTRAVENOUS ONCE
Status: COMPLETED | OUTPATIENT
Start: 2023-03-04 | End: 2023-03-04

## 2023-03-04 RX ORDER — TERBUTALINE SULFATE 1 MG/ML
0.25 INJECTION, SOLUTION SUBCUTANEOUS AS NEEDED
Status: DISCONTINUED | OUTPATIENT
Start: 2023-03-04 | End: 2023-03-04

## 2023-03-04 RX ORDER — IBUPROFEN 600 MG/1
600 TABLET ORAL EVERY 6 HOURS PRN
Status: DISCONTINUED | OUTPATIENT
Start: 2023-03-04 | End: 2023-03-04

## 2023-03-04 RX ORDER — METHYLERGONOVINE MALEATE 0.2 MG/ML
INJECTION INTRAVENOUS
Status: COMPLETED
Start: 2023-03-04 | End: 2023-03-04

## 2023-03-04 RX ORDER — ONDANSETRON 2 MG/ML
4 INJECTION INTRAMUSCULAR; INTRAVENOUS EVERY 6 HOURS PRN
Status: DISCONTINUED | OUTPATIENT
Start: 2023-03-04 | End: 2023-03-04

## 2023-03-04 RX ORDER — IBUPROFEN 600 MG/1
600 TABLET ORAL EVERY 6 HOURS
Status: DISCONTINUED | OUTPATIENT
Start: 2023-03-04 | End: 2023-03-06

## 2023-03-04 RX ORDER — NALBUPHINE HCL 10 MG/ML
2.5 AMPUL (ML) INJECTION
Status: DISCONTINUED | OUTPATIENT
Start: 2023-03-04 | End: 2023-03-04

## 2023-03-04 RX ORDER — ACETAMINOPHEN 500 MG
500 TABLET ORAL EVERY 6 HOURS PRN
Status: DISCONTINUED | OUTPATIENT
Start: 2023-03-04 | End: 2023-03-04

## 2023-03-04 RX ORDER — LIDOCAINE HYDROCHLORIDE AND EPINEPHRINE 15; 5 MG/ML; UG/ML
INJECTION, SOLUTION EPIDURAL AS NEEDED
Status: DISCONTINUED | OUTPATIENT
Start: 2023-03-04 | End: 2023-03-04 | Stop reason: SURG

## 2023-03-04 RX ADMIN — LIDOCAINE HYDROCHLORIDE AND EPINEPHRINE 3 ML: 15; 5 INJECTION, SOLUTION EPIDURAL at 18:10:00

## 2023-03-04 RX ADMIN — LIDOCAINE HYDROCHLORIDE AND EPINEPHRINE 2 ML: 15; 5 INJECTION, SOLUTION EPIDURAL at 18:11:00

## 2023-03-04 NOTE — PROGRESS NOTES
Pt is a 33year old female admitted to [facility]. Patient presents with:  R/o Labor: Contraction since 1000     Pt is  40w0d intra-uterine pregnancy. History obtained, consents signed. Oriented to room, staff, and plan of care. Updated , orders received to admit pt in labor, IV hydration, epidural for pain management and labor expected management.   Report given to Shantell Angulo pt transferred to

## 2023-03-04 NOTE — PLAN OF CARE
Problem: BIRTH - VAGINAL/ SECTION  Goal: Fetal and maternal status remain reassuring during the birth process  Description: INTERVENTIONS:  - Monitor vital signs  - Monitor fetal heart rate  - Monitor uterine activity  - Monitor labor progression (vaginal delivery)  - DVT prophylaxis (C/S delivery)  - Surgical antibiotic prophylaxis (C/S delivery)  Outcome: Progressing     Problem: PAIN - ADULT  Goal: Verbalizes/displays adequate comfort level or patient's stated pain goal  Description: INTERVENTIONS:  - Encourage pt to monitor pain and request assistance  - Assess pain using appropriate pain scale  - Administer analgesics based on type and severity of pain and evaluate response  - Implement non-pharmacological measures as appropriate and evaluate response  - Consider cultural and social influences on pain and pain management  - Manage/alleviate anxiety  - Utilize distraction and/or relaxation techniques  - Monitor for opioid side effects  - Notify MD/LIP if interventions unsuccessful or patient reports new pain  - Anticipate increased pain with activity and pre-medicate as appropriate  Outcome: Progressing     Problem: ANXIETY  Goal: Will report anxiety at manageable levels  Description: INTERVENTIONS:  - Administer medication as ordered  - Teach and rehearse alternative coping skills  - Provide emotional support with 1:1 interaction with staff  Outcome: Progressing     Problem: Patient/Family Goals  Goal: Patient/Family Long Term Goal  Description: Patient's Long Term Goal:     Interventions:  -   - See additional Care Plan goals for specific interventions  Outcome: Progressing  Note: Adequate pain control with delivery of infant  Interventions:  Pain assessment scores as ordered  Patient scores pain a \"3\" or less  Multidisciplinary care   Nonpharmacologic comfort measures   Goal: Patient/Family Short Term Goal  Description: Patient's Short Term Goal:     Interventions:   -  - See additional Care Plan goals for specific interventions  Outcome: Progressing  Note: Uncomplicated vaginal delivery    Interventions:  VS per protocol  I&O  Ice chips and sips as tolerated  EFM per protocol  Maintain IV as ordered  Antibiotics as needed per protocol  Informed consent

## 2023-03-05 LAB
BASOPHILS # BLD AUTO: 0.03 X10(3) UL (ref 0–0.2)
BASOPHILS NFR BLD AUTO: 0.3 %
EOSINOPHIL # BLD AUTO: 0.19 X10(3) UL (ref 0–0.7)
EOSINOPHIL NFR BLD AUTO: 1.7 %
ERYTHROCYTE [DISTWIDTH] IN BLOOD BY AUTOMATED COUNT: 13.4 %
HCT VFR BLD AUTO: 33.7 %
HGB BLD-MCNC: 11.2 G/DL
IMM GRANULOCYTES # BLD AUTO: 0.28 X10(3) UL (ref 0–1)
IMM GRANULOCYTES NFR BLD: 2.5 %
LYMPHOCYTES # BLD AUTO: 1.55 X10(3) UL (ref 1–4)
LYMPHOCYTES NFR BLD AUTO: 13.6 %
MCH RBC QN AUTO: 30.6 PG (ref 26–34)
MCHC RBC AUTO-ENTMCNC: 33.2 G/DL (ref 31–37)
MCV RBC AUTO: 92.1 FL
MONOCYTES # BLD AUTO: 1.03 X10(3) UL (ref 0.1–1)
MONOCYTES NFR BLD AUTO: 9.1 %
NEUTROPHILS # BLD AUTO: 8.28 X10 (3) UL (ref 1.5–7.7)
NEUTROPHILS # BLD AUTO: 8.28 X10(3) UL (ref 1.5–7.7)
NEUTROPHILS NFR BLD AUTO: 72.8 %
PLATELET # BLD AUTO: 123 10(3)UL (ref 150–450)
RBC # BLD AUTO: 3.66 X10(6)UL
WBC # BLD AUTO: 11.4 X10(3) UL (ref 4–11)

## 2023-03-05 NOTE — L&D DELIVERY NOTE
Tien Hayes, Girl [[id number]]    Labor Events     labor?: No   steroids?: None  Antibiotics received during labor?: No  Antibiotics (enter # doses in comment): none  Rupture date/time: 3/4/2023 185     Rupture type: AROM  Fluid color: Clear  Induction: None  Augmentation: AROM  Indications for augmentation: Ineffective Contraction Pattern  Intrapartum & labor complications: None     Labor Event Times    Labor onset date/time: 3/4/2023 1000  Dilation complete date/time: 3/4/2023 2057  Start pushing date/time: 3/4/2023 2102      Presentation    Presentation: Vertex  Position: Left Occiput Anterior     Operative Delivery    Operative Vaginal Delivery: No            Shoulder Dystocia    Shoulder Dystocia: No     Anesthesia    Method: Epidural           Delivery    Delivery date/time:  3/4/23 21:05:45   Delivery type: Normal spontaneous vaginal delivery    Details:     Delivery location: delivery room     Delivery Providers    Delivering Clinician: Kia Bagley MD   Delivery personnel:  Provider Role   Marisela Quinn, NAOMI Baby Nurse   Demond Rasmussen RN Delivery Nurse         Cord    Vessels: 3 Vessels  Complications: None  Timed cord clamping: Yes  Time in sec: 39  Cord blood disposition: to lab  Gases sent?: Yes     Resuscitation    Method: None     [place] Measurements    Weight: 3430 g 7 lb 9 oz Length: 50.8 cm   Head circum. : 34 cm Chest circum.: 33 cm      Abdominal circum.: 31.5 cm       Placenta    Date/time: 3/4/2023 2111  Removal: Spontaneous  Appearance: Intact  Disposition: held for future pathology     Apgars    Living status: Living   Apgar Scoring Key:    0 1 2    Skin color Blue or pale Acrocyanotic Completely pink    Heart rate Absent <100 bpm >100 bpm    Reflex irritability No response Grimace Cry or active withdrawal    Muscle tone Limp Some flexion Active motion    Respiratory effort Absent Weak cry; hypoventilation Good, crying              1 Minute:  5 Minute:  10 Minute:  15 Minute:  20 Minute:    Skin color: 1  1       Heart rate: 2  2       Reflex irritablity: 2  2       Muscle tone: 2  2       Respiratory effort: 2  2       Total: 9  9          Apgars assigned by: Libra Brand  [place] disposition: with mother     Skin to Skin    No data filed     Vaginal Count    Initial count RN: Ailyn Langston RN  Initial count Tech: Lurdes Reus   Sponges   Sharps    Initial counts 11   0    Final counts 11   1    Final count RN: Karli Cedillo RN  Final count MD: Joana Crain MD     Delivery (Maternal)    Episiotomy: None  Perineal lacerations: 1st Repaired?: Yes   Vaginal laceration?: No    Cervical laceration?: No    Clitoral laceration?: No              33year old  at 40w0d admitted in labor. Pregnancy uncomplicated. Labor augmented with amniotomy, progressed to complete. Pushed over one contraction to bring the fetal head to . As the head was delivered the perineum was supported to decrease the risk of tearing. The shoulders and remainder of the infant followed without difficulty to complete uncomplicated  of vigorous female infant, APGARS 9/9, weight 7lb 9oz. The infant was dried and suctioned. Cord clamping delayed and infant placed on maternal abdomen. Placenta delivered spontaneously, intact. 1st degree laceration repaired with 3-0 vicryl. QBL pending, per delivery summary, approx 250 mL.     Joseph Mason MD

## 2023-03-05 NOTE — PLAN OF CARE
Problem: SAFETY ADULT - FALL  Goal: Free from fall injury  Description: INTERVENTIONS:  - Assess pt frequently for physical needs  - Identify cognitive and physical deficits and behaviors that affect risk of falls. - [place] fall precautions as indicated by assessment.  - Educate pt/family on patient safety including physical limitations  - Instruct pt to call for assistance with activity based on assessment  - Modify environment to reduce risk of injury  - Provide assistive devices as appropriate  - Consider OT/PT consult to assist with strengthening/mobility  - Encourage toileting schedule  Outcome: Progressing     Problem: POSTPARTUM  Goal: Long Term Goal:Experiences normal postpartum course  Description: INTERVENTIONS:  - Assess and monitor vital signs and lab values. - Assess fundus and lochia. - Provide ice/sitz baths for perineum discomfort. - Monitor healing of incision/episiotomy/laceration, and assess for signs and symptoms of infection and hematoma. - Assess bladder function and monitor for bladder distention.  - Provide/instruct/assist with pericare as needed. - Provide VTE prophylaxis as needed. - Monitor bowel function.  - Encourage ambulation and provide assistance as needed. - Assess and monitor emotional status and provide social service/psych resources as needed. - Utilize standard precautions and use personal protective equipment as indicated. Ensure aseptic care of all intravenous lines and invasive tubes/drains.  - Obtain immunization and exposure to communicable diseases history. Outcome: Progressing  Goal: Optimize infant feeding at the breast  Description: INTERVENTIONS:  - Initiate breast feeding within first hour after birth. - Monitor effectiveness of current breast feeding efforts. - Assess support systems available to mother/family.  - Identify cultural beliefs/practices regarding lactation, letdown techniques, maternal food preferences.   - Assess mother's knowledge and previous experience with breast feeding.  - Provide information as needed about early infant feeding cues (e.g., rooting, lip smacking, sucking fingers/hand) versus late cue of crying.  - Discuss/demonstrate breast feeding aids (e.g., infant sling, nursing footstool/pillows, and breast pumps). - Encourage mother/other family members to express feelings/concerns, and actively listen. - Educate father/SO about benefits of breast feeding and how to manage common lactation challenges. - Recommend avoidance of specific medications or substances incompatible with breast feeding.  - Assess and monitor for signs of nipple pain/trauma. - Instruct and provide assistance with proper latch. - Review techniques for milk expression (breast pumping) and storage of breast milk. Provide pumping equipment/supplies, instructions and assistance, as needed. - Encourage rooming-in and breast feeding on demand.  - Encourage skin-to-skin contact. - Provide LC support as needed. - Assess for and manage engorgement. - Provide breast feeding education handouts and information on community breast feeding support. Outcome: Progressing  Goal: Establishment of adequate milk supply with medication/procedure interruptions  Description: INTERVENTIONS:  - Review techniques for milk expression (breast pumping). - Provide pumping equipment/supplies, instructions, and assistance until it is safe to breastfeed infant. Outcome: Progressing  Goal: Appropriate maternal -  bonding  Description: INTERVENTIONS:  - Assess caregiver- interactions. - Assess caregiver's emotional status and coping mechanisms. - Encourage caregiver to participate in  daily care. - Assess support systems available to mother/family.  - Provide /case management support as needed.   Outcome: Progressing     Problem: RESPIRATORY - ADULT  Goal: Achieves optimal ventilation and oxygenation  Description: INTERVENTIONS:  - Assess for changes in respiratory status  - Assess for changes in mentation and behavior  - Position to facilitate oxygenation and minimize respiratory effort  - Oxygen supplementation based on oxygen saturation or ABGs  - Provide Smoking Cessation handout, if applicable  - Encourage broncho-pulmonary hygiene including cough, deep breathe, Incentive Spirometry  - Assess the need for suctioning and perform as needed  - Assess and instruct to report SOB or any respiratory difficulty  - Respiratory Therapy support as indicated  - Manage/alleviate anxiety  - Monitor for signs/symptoms of CO2 retention  Outcome: Progressing

## 2023-03-05 NOTE — PROGRESS NOTES
Patient up to bathroom with assist.  Voided 450. Patient transferred to mother/baby room 2215 per wheelchair in stable condition with baby and personal belongings. Accompanied by significant other and staff. Report given to mother/baby RN.

## 2023-03-05 NOTE — PROGRESS NOTES
Report given to [facility], and will take over taking care of this patient and her baby

## 2023-03-05 NOTE — PLAN OF CARE
Problem: SAFETY ADULT - FALL  Goal: Free from fall injury  Description: INTERVENTIONS:  - Assess pt frequently for physical needs  - Identify cognitive and physical deficits and behaviors that affect risk of falls.   - [place] fall precautions as indicated by assessment.  - Educate pt/family on patient safety including physical limitations  - Instruct pt to call for assistance with activity based on assessment  - Modify environment to reduce risk of injury  - Provide assistive devices as appropriate  - Consider OT/PT consult to assist with strengthening/mobility  - Encourage toileting schedule  Outcome: Progressing     Problem: BIRTH - VAGINAL/ SECTION  Goal: Fetal and maternal status remain reassuring during the birth process  Description: INTERVENTIONS:  - Monitor vital signs  - Monitor fetal heart rate  - Monitor uterine activity  - Monitor labor progression (vaginal delivery)  - DVT prophylaxis (C/S delivery)  - Surgical antibiotic prophylaxis (C/S delivery)  Outcome: Completed     Problem: PAIN - ADULT  Goal: Verbalizes/displays adequate comfort level or patient's stated pain goal  Description: INTERVENTIONS:  - Encourage pt to monitor pain and request assistance  - Assess pain using appropriate pain scale  - Administer analgesics based on type and severity of pain and evaluate response  - Implement non-pharmacological measures as appropriate and evaluate response  - Consider cultural and social influences on pain and pain management  - Manage/alleviate anxiety  - Utilize distraction and/or relaxation techniques  - Monitor for opioid side effects  - Notify MD/LIP if interventions unsuccessful or patient reports new pain  - Anticipate increased pain with activity and pre-medicate as appropriate  Outcome: Completed     Problem: ANXIETY  Goal: Will report anxiety at manageable levels  Description: INTERVENTIONS:  - Administer medication as ordered  - Teach and rehearse alternative coping skills  - Provide emotional support with 1:1 interaction with staff  Outcome: Completed     Problem: Patient/Family Goals  Goal: Patient/Family Long Term Goal  Description: Patient's Long Term Goal: safe uncomplicated delivery  - See additional Care Plan goals for specific interventions  Outcome: Completed  Goal: Patient/Family Short Term Goal  Description: Patient's Short Term Goal: adequate pain control  - See additional Care Plan goals for specific interventions  Outcome: Completed

## 2023-03-05 NOTE — ANESTHESIA PROCEDURE NOTES
Labor Analgesia    Date/Time: 3/4/2023 5:50 PM    Performed by: Brian Escobar MD  Authorized by: Brian Escobar MD      General Information and Staff    Start Time:  3/4/2023 5:50 PM  End Time:  3/4/2023 6:10 PM  Anesthesiologist:  Brian Escobar MD  Performed by: Anesthesiologist  Patient Location:  OB  Site Identification: surface landmarks    Reason for Block: labor epidural    Preanesthetic Checklist: patient identified, IV checked, risks and benefits discussed, monitors and equipment checked, pre-op evaluation, timeout performed, IV bolus, anesthesia consent and sterile technique used      Procedure Details    Patient Position:  Sitting  Prep: ChloraPrep    Monitoring:  Heart rate and continuous pulse ox  Approach:  Midline    Epidural Needle    Injection Technique:   Needle Type:  Tuohy  Needle Gauge:  17 G  Needle Length:  3.375 in  Needle Insertion Depth:  6    Spinal Needle      Catheter    Catheter Type:  End hole  Catheter Size:  19 G  Catheter at Skin Depth:  11  Test Dose:  Negative    Assessment    Sensory Level:  T8    Additional Comments       Test dose 3cc + 2cc + Fentanyl 100mcg via epidural catheter.   Infusion started at 12cc/hr

## 2023-03-06 VITALS
OXYGEN SATURATION: 100 % | DIASTOLIC BLOOD PRESSURE: 68 MMHG | TEMPERATURE: 98 F | RESPIRATION RATE: 14 BRPM | HEIGHT: 63 IN | SYSTOLIC BLOOD PRESSURE: 96 MMHG | BODY MASS INDEX: 31.71 KG/M2 | HEART RATE: 80 BPM | WEIGHT: 179 LBS

## 2023-03-06 RX ORDER — IBUPROFEN 600 MG/1
600 TABLET ORAL EVERY 6 HOURS PRN
Qty: 30 TABLET | Refills: 0 | Status: SHIPPED | OUTPATIENT
Start: 2023-03-06

## 2023-03-06 RX ORDER — PSEUDOEPHEDRINE HCL 30 MG
100 TABLET ORAL 2 TIMES DAILY PRN
Qty: 30 CAPSULE | Refills: 0 | Status: SHIPPED | OUTPATIENT
Start: 2023-03-06

## 2023-03-06 NOTE — PLAN OF CARE
Problem: SAFETY ADULT - FALL  Goal: Free from fall injury  Description: INTERVENTIONS:  - Assess pt frequently for physical needs  - Identify cognitive and physical deficits and behaviors that affect risk of falls. - [place] fall precautions as indicated by assessment.  - Educate pt/family on patient safety including physical limitations  - Instruct pt to call for assistance with activity based on assessment  - Modify environment to reduce risk of injury  - Provide assistive devices as appropriate  - Consider OT/PT consult to assist with strengthening/mobility  - Encourage toileting schedule  Outcome: Progressing     Problem: POSTPARTUM  Goal: Long Term Goal:Experiences normal postpartum course  Description: INTERVENTIONS:  - Assess and monitor vital signs and lab values. - Assess fundus and lochia. - Provide ice/sitz baths for perineum discomfort. - Monitor healing of incision/episiotomy/laceration, and assess for signs and symptoms of infection and hematoma. - Assess bladder function and monitor for bladder distention.  - Provide/instruct/assist with pericare as needed. - Provide VTE prophylaxis as needed. - Monitor bowel function.  - Encourage ambulation and provide assistance as needed. - Assess and monitor emotional status and provide social service/psych resources as needed. - Utilize standard precautions and use personal protective equipment as indicated. Ensure aseptic care of all intravenous lines and invasive tubes/drains.  - Obtain immunization and exposure to communicable diseases history. Outcome: Progressing  Goal: Optimize infant feeding at the breast  Description: INTERVENTIONS:  - Initiate breast feeding within first hour after birth. - Monitor effectiveness of current breast feeding efforts. - Assess support systems available to mother/family.  - Identify cultural beliefs/practices regarding lactation, letdown techniques, maternal food preferences.   - Assess mother's knowledge and previous experience with breast feeding.  - Provide information as needed about early infant feeding cues (e.g., rooting, lip smacking, sucking fingers/hand) versus late cue of crying.  - Discuss/demonstrate breast feeding aids (e.g., infant sling, nursing footstool/pillows, and breast pumps). - Encourage mother/other family members to express feelings/concerns, and actively listen. - Educate father/SO about benefits of breast feeding and how to manage common lactation challenges. - Recommend avoidance of specific medications or substances incompatible with breast feeding.  - Assess and monitor for signs of nipple pain/trauma. - Instruct and provide assistance with proper latch. - Review techniques for milk expression (breast pumping) and storage of breast milk. Provide pumping equipment/supplies, instructions and assistance, as needed. - Encourage rooming-in and breast feeding on demand.  - Encourage skin-to-skin contact. - Provide LC support as needed. - Assess for and manage engorgement. - Provide breast feeding education handouts and information on community breast feeding support. Outcome: Progressing  Goal: Appropriate maternal -  bonding  Description: INTERVENTIONS:  - Assess caregiver- interactions. - Assess caregiver's emotional status and coping mechanisms. - Encourage caregiver to participate in  daily care. - Assess support systems available to mother/family.  - Provide /case management support as needed.   Outcome: Progressing     Problem: RESPIRATORY - ADULT  Goal: Achieves optimal ventilation and oxygenation  Description: INTERVENTIONS:  - Assess for changes in respiratory status  - Assess for changes in mentation and behavior  - Position to facilitate oxygenation and minimize respiratory effort  - Oxygen supplementation based on oxygen saturation or ABGs  - Provide Smoking Cessation handout, if applicable  - Encourage broncho-pulmonary hygiene including cough, deep breathe, Incentive Spirometry  - Assess the need for suctioning and perform as needed  - Assess and instruct to report SOB or any respiratory difficulty  - Respiratory Therapy support as indicated  - Manage/alleviate anxiety  - Monitor for signs/symptoms of CO2 retention  Outcome: Progressing

## 2023-03-06 NOTE — PLAN OF CARE
Problem: SAFETY ADULT - FALL  Goal: Free from fall injury  Description: INTERVENTIONS:  - Assess pt frequently for physical needs  - Identify cognitive and physical deficits and behaviors that affect risk of falls. - Fletcher fall precautions as indicated by assessment.  - Educate pt/family on patient safety including physical limitations  - Instruct pt to call for assistance with activity based on assessment  - Modify environment to reduce risk of injury  - Provide assistive devices as appropriate  - Consider OT/PT consult to assist with strengthening/mobility  - Encourage toileting schedule  Outcome: Completed     Problem: POSTPARTUM  Goal: Long Term Goal:Experiences normal postpartum course  Description: INTERVENTIONS:  - Assess and monitor vital signs and lab values. - Assess fundus and lochia. - Provide ice/sitz baths for perineum discomfort. - Monitor healing of incision/episiotomy/laceration, and assess for signs and symptoms of infection and hematoma. - Assess bladder function and monitor for bladder distention.  - Provide/instruct/assist with pericare as needed. - Provide VTE prophylaxis as needed. - Monitor bowel function.  - Encourage ambulation and provide assistance as needed. - Assess and monitor emotional status and provide social service/psych resources as needed. - Utilize standard precautions and use personal protective equipment as indicated. Ensure aseptic care of all intravenous lines and invasive tubes/drains.  - Obtain immunization and exposure to communicable diseases history. Outcome: Completed  Goal: Optimize infant feeding at the breast  Description: INTERVENTIONS:  - Initiate breast feeding within first hour after birth. - Monitor effectiveness of current breast feeding efforts. - Assess support systems available to mother/family.  - Identify cultural beliefs/practices regarding lactation, letdown techniques, maternal food preferences.   - Assess mother's knowledge and previous experience with breast feeding.  - Provide information as needed about early infant feeding cues (e.g., rooting, lip smacking, sucking fingers/hand) versus late cue of crying.  - Discuss/demonstrate breast feeding aids (e.g., infant sling, nursing footstool/pillows, and breast pumps). - Encourage mother/other family members to express feelings/concerns, and actively listen. - Educate father/SO about benefits of breast feeding and how to manage common lactation challenges. - Recommend avoidance of specific medications or substances incompatible with breast feeding.  - Assess and monitor for signs of nipple pain/trauma. - Instruct and provide assistance with proper latch. - Review techniques for milk expression (breast pumping) and storage of breast milk. Provide pumping equipment/supplies, instructions and assistance, as needed. - Encourage rooming-in and breast feeding on demand.  - Encourage skin-to-skin contact. - Provide LC support as needed. - Assess for and manage engorgement. - Provide breast feeding education handouts and information on community breast feeding support. Outcome: Completed  Goal: Appropriate maternal -  bonding  Description: INTERVENTIONS:  - Assess caregiver- interactions. - Assess caregiver's emotional status and coping mechanisms. - Encourage caregiver to participate in  daily care. - Assess support systems available to mother/family.  - Provide /case management support as needed.   Outcome: Completed     Problem: RESPIRATORY - ADULT  Goal: Achieves optimal ventilation and oxygenation  Description: INTERVENTIONS:  - Assess for changes in respiratory status  - Assess for changes in mentation and behavior  - Position to facilitate oxygenation and minimize respiratory effort  - Oxygen supplementation based on oxygen saturation or ABGs  - Provide Smoking Cessation handout, if applicable  - Encourage broncho-pulmonary hygiene including cough, deep breathe, Incentive Spirometry  - Assess the need for suctioning and perform as needed  - Assess and instruct to report SOB or any respiratory difficulty  - Respiratory Therapy support as indicated  - Manage/alleviate anxiety  - Monitor for signs/symptoms of CO2 retention  Outcome: Completed

## 2023-03-06 NOTE — PROGRESS NOTES
Discharge instructions discussed, reviewed and given to patient. Questions encouraged and answered. Patient verbalized understanding and agreement. Patient discharged to home as ordered.

## 2023-03-08 ENCOUNTER — TELEPHONE (OUTPATIENT)
Dept: OBGYN UNIT | Facility: HOSPITAL | Age: 33
End: 2023-03-08

## 2023-03-08 NOTE — PROGRESS NOTES
Reviewed self and infant care w / mom, she verbalizes understanding of instructions reviewed. Encourage to follow up w/ MDs as directed and w/ questions/concerns. Sl jaundice, care reviewed, br well.

## 2023-03-09 ENCOUNTER — PATIENT MESSAGE (OUTPATIENT)
Dept: OBGYN CLINIC | Facility: CLINIC | Age: 33
End: 2023-03-09

## 2023-03-09 NOTE — TELEPHONE ENCOUNTER
From: Chaitanya Kruger  To: Rich Louis MD  Sent: 3/9/2023 10:11 AM CST  Subject: Breast Pump    Hi,    I called my insurance, and they said I need a referral for a breast pump.     Thanks,  Dominick Escoto

## 2023-03-21 NOTE — TELEPHONE ENCOUNTER
Referral Status : Closed     Quang Mcdonald Rational:     "[employer] and [employer] is not a [facility] [employer] contracted provider. DME must be issued by a [facility] [employer] contracted provider. Please refer to a contracted provider. Suggested provider is [facility] [place] [facility], Anurag Morgan or Tennille Ortiz. Reference number: [id number]     To comply with [facility] timeliness standards, this request has been closed. Patient notified via [address]

## 2023-03-27 NOTE — TELEPHONE ENCOUNTER
PATIENT NAME:  Christina Murphy [facility]/ [phone] (home)/ There is no work phone number on file. PATIENT :  1990  REFERRAL ID #:  [id number]  REFERRAL STATUS:  Authorized [1]  REVIEW REFERRAL NOTES FOR MORE INFORMATION:  DATE AUTHORIZED:  2023 // Despina Canavan DATE: 2023   REFERRED BY:  Thang Martell MD (TEL: [phone])  REFERRED TO: No referral provider, MD (TEL: No Referred to Provider on Referral)     No vendor specified on referral. / No vendor phone number known.   [facility]  REFERRED FOR:    Diagnoses:    Z39.1 (ICD-10-CM) - V24.1 (ICD-9-CM) - Lactating mother  Procedures:     224253 - DME - EXTERNAL    NUMBER OF VISITS AUTH: 1

## 2023-04-16 PROBLEM — Z34.90 PREGNANCY (HCC): Status: RESOLVED | Noted: 2023-03-04 | Resolved: 2023-04-16

## 2023-04-16 PROBLEM — Z34.90 PREGNANCY: Status: RESOLVED | Noted: 2023-03-04 | Resolved: 2023-04-16

## 2023-04-16 PROBLEM — Z34.80 SUPERVISION OF OTHER NORMAL PREGNANCY, ANTEPARTUM (HCC): Status: RESOLVED | Noted: 2022-09-03 | Resolved: 2023-04-16

## 2023-04-16 PROBLEM — Z34.80 SUPERVISION OF OTHER NORMAL PREGNANCY, ANTEPARTUM: Status: RESOLVED | Noted: 2022-09-03 | Resolved: 2023-04-16

## 2023-04-17 ENCOUNTER — POSTPARTUM (OUTPATIENT)
Dept: OBGYN CLINIC | Facility: CLINIC | Age: 33
End: 2023-04-17
Payer: COMMERCIAL

## 2023-04-17 VITALS
BODY MASS INDEX: 28 KG/M2 | DIASTOLIC BLOOD PRESSURE: 78 MMHG | WEIGHT: 159.13 LBS | SYSTOLIC BLOOD PRESSURE: 112 MMHG | HEART RATE: 92 BPM

## 2023-06-23 ENCOUNTER — TELEPHONE (OUTPATIENT)
Dept: FAMILY MEDICINE CLINIC | Facility: CLINIC | Age: 33
End: 2023-06-23

## 2023-06-23 ENCOUNTER — OFFICE VISIT (OUTPATIENT)
Dept: FAMILY MEDICINE CLINIC | Facility: CLINIC | Age: 33
End: 2023-06-23
Payer: COMMERCIAL

## 2023-06-23 VITALS
TEMPERATURE: 98 F | DIASTOLIC BLOOD PRESSURE: 80 MMHG | HEART RATE: 90 BPM | OXYGEN SATURATION: 98 % | SYSTOLIC BLOOD PRESSURE: 113 MMHG

## 2023-06-23 DIAGNOSIS — J06.9 VIRAL UPPER RESPIRATORY ILLNESS: ICD-10-CM

## 2023-06-23 DIAGNOSIS — U07.1 COVID-19 VIRUS INFECTION: Primary | ICD-10-CM

## 2023-06-23 LAB
OPERATOR ID: ABNORMAL
RAPID SARS-COV-2 BY PCR: DETECTED

## 2023-06-23 NOTE — TELEPHONE ENCOUNTER
When did symptoms start? If symptoms started less than 7 days ago, recommend patient treat symptoms with OTC medications as needed, such as Mucinex, Sudafed/decongestant, antihistamine, cough suppressant. Since multiple family members are ill, this seems to be a viral illness, and an antibiotic would not be indicated at this time. Stay well-hydrated, drink at least 64 ounces of water a day. Can take vitamin C supplement or Emergen-C twice a day.

## 2023-06-23 NOTE — TELEPHONE ENCOUNTER
Pt called and stated that she is having sick symptoms. States she feels like she has a possible sinus infection. Pt is seeking advice.

## 2023-06-23 NOTE — TELEPHONE ENCOUNTER
Spoke with mother daughter son and  all sick wondering if she can be seen today feels congested and would like ears checked has pressure/discomfort to bilateral ears. Per mother if can not be seen today will go to immediate care please advise.

## 2023-06-23 NOTE — TELEPHONE ENCOUNTER
Patient reports symptoms started aprox Monday has only been taking tylenol advised per Dr. Kishor Flowers she may benefit from OTC medications as needed such as Mucinex, sudafed-decongestant, antihistamine, cough supressant. Since multiple family members are ill, this seems to be a viral illness, and an antibiotic would not be indicated at this time. Advised patient she will need to stay well-hydrated, drink at least 64 ounces of water a day. Can take vitamin C supplement or Emergen-C twice a day. Mother verbalized understanding reports may want to present to urgent care as she feels her ears are still bothering her and other symptoms have resolved.

## 2023-07-13 ENCOUNTER — TELEPHONE (OUTPATIENT)
Dept: FAMILY MEDICINE CLINIC | Facility: CLINIC | Age: 33
End: 2023-07-13

## 2023-07-13 ENCOUNTER — OFFICE VISIT (OUTPATIENT)
Dept: FAMILY MEDICINE CLINIC | Facility: CLINIC | Age: 33
End: 2023-07-13
Payer: COMMERCIAL

## 2023-07-13 VITALS
RESPIRATION RATE: 18 BRPM | WEIGHT: 153 LBS | OXYGEN SATURATION: 97 % | HEIGHT: 63 IN | BODY MASS INDEX: 27.11 KG/M2 | TEMPERATURE: 98 F | HEART RATE: 83 BPM | SYSTOLIC BLOOD PRESSURE: 102 MMHG | DIASTOLIC BLOOD PRESSURE: 72 MMHG

## 2023-07-13 DIAGNOSIS — H65.93 FLUID LEVEL BEHIND TYMPANIC MEMBRANE OF BOTH EARS: Primary | ICD-10-CM

## 2023-07-13 PROCEDURE — 3008F BODY MASS INDEX DOCD: CPT | Performed by: NURSE PRACTITIONER

## 2023-07-13 PROCEDURE — 99213 OFFICE O/P EST LOW 20 MIN: CPT | Performed by: NURSE PRACTITIONER

## 2023-07-13 PROCEDURE — 3074F SYST BP LT 130 MM HG: CPT | Performed by: NURSE PRACTITIONER

## 2023-07-13 PROCEDURE — 3078F DIAST BP <80 MM HG: CPT | Performed by: NURSE PRACTITIONER

## 2023-07-13 NOTE — TELEPHONE ENCOUNTER
Pt calling ringing in both of her ears and congestion. Pt recently had covid. No appts available. Please advise. Thank you.

## 2023-07-13 NOTE — TELEPHONE ENCOUNTER
Patient c/o bilateral ears plugged up and ringing in ears. Stated not really painful but uncomfortable. Was positive for Covid 2 weeks ago at walk-in clinic,  was told there was fluid in ears. Patient advised on being seen at the walk-in clinic for a recheck as there are no appts available in office. Verbalized understanding.

## 2024-01-05 ENCOUNTER — PATIENT MESSAGE (OUTPATIENT)
Dept: FAMILY MEDICINE CLINIC | Facility: CLINIC | Age: 34
End: 2024-01-05

## 2024-01-08 ENCOUNTER — TELEPHONE (OUTPATIENT)
Dept: FAMILY MEDICINE CLINIC | Facility: CLINIC | Age: 34
End: 2024-01-08

## 2024-01-08 NOTE — TELEPHONE ENCOUNTER
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  Pt was advised if her symptoms get worse prior to her appt to go to the nearest ER and pt verbalized understanding

## 2024-01-08 NOTE — TELEPHONE ENCOUNTER
Pt calling regarding Mychart message and wanted to add she is now having shortness of breath.    Leticia Ann   to [facility] Clinical Staff (supporting Gema Hernandez DO)         1/5/24  1:43 PM  Hi,     Since [place] Day, I have been having some minor chest pain on the left side.  It comes and goes.  It feels a bit like pressure or tension.  For the past few days, I have taken my pulse with the oximeter, and when I have the pain, my pulse goes to 100-105.  When I don't have the pain, my pulse seems to be in the 80s.  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  I am also still breastfeeding.  I am not diagnosed with any condition so I am wondering if I should have a check up or what you might suggest.  Thank you!     Leticia Ann

## 2024-01-08 NOTE — TELEPHONE ENCOUNTER
From: Leticia Ann  To: Gema Hernandez  Sent: 1/5/2024 1:43 PM CST  Subject: Minor Chest Pain/Elevated Pulse    Hi,    Since Alysa Day, I have been having some minor chest pain on the left side. It comes and goes. It feels a bit like pressure or tension. For the past few days, I have taken my pulse with the oximeter, and when I have the pain, my pulse goes to 100-105. When I don't have the pain, my pulse seems to be in the 80s. 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 I am not diagnosed with any condition so I am wondering if I should have a check up or what you might suggest. Thank you!    Leticia Ann

## 2024-01-17 ENCOUNTER — HOSPITAL ENCOUNTER (OUTPATIENT)
Age: 34
Discharge: HOME | End: 2024-01-17
Attending: EMERGENCY MEDICINE
Payer: COMMERCIAL

## 2024-01-17 ENCOUNTER — APPOINTMENT (OUTPATIENT)
Dept: GENERAL RADIOLOGY | Age: 34
End: 2024-01-17
Attending: EMERGENCY MEDICINE
Payer: COMMERCIAL

## 2024-01-17 VITALS
TEMPERATURE: 99 F | HEIGHT: 63 IN | SYSTOLIC BLOOD PRESSURE: 133 MMHG | DIASTOLIC BLOOD PRESSURE: 77 MMHG | WEIGHT: 152 LBS | RESPIRATION RATE: 18 BRPM | BODY MASS INDEX: 26.93 KG/M2 | HEART RATE: 83 BPM | OXYGEN SATURATION: 98 %

## 2024-01-17 DIAGNOSIS — R07.9 CHEST PAIN OF UNCERTAIN ETIOLOGY: Primary | ICD-10-CM

## 2024-01-17 LAB
#MXD IC: 1.3 X10ˆ3/UL (ref 0.1–1)
BUN BLD-MCNC: 18 MG/DL (ref 7–18)
CHLORIDE BLD-SCNC: 105 MMOL/L (ref 98–112)
CO2 BLD-SCNC: 25 MMOL/L (ref 21–32)
CREAT BLD-MCNC: 0.6 MG/DL
EGFRCR SERPLBLD CKD-EPI 2021: 122 ML/MIN/1.73M2 (ref 60–?)
GLUCOSE BLD-MCNC: 95 MG/DL (ref 70–99)
HCT VFR BLD AUTO: 38.1 %
HCT VFR BLD CALC: 37 %
HGB BLD-MCNC: 12.3 G/DL
ISTAT IONIZED CALCIUM FOR CHEM 8: 1.24 MMOL/L (ref 1.12–1.32)
LYMPHOCYTES # BLD AUTO: 2.2 X10ˆ3/UL (ref 1–4)
LYMPHOCYTES NFR BLD AUTO: 25.1 %
MCH RBC QN AUTO: 29.3 PG (ref 26–34)
MCHC RBC AUTO-ENTMCNC: 32.3 G/DL (ref 31–37)
MCV RBC AUTO: 90.7 FL (ref 80–100)
MIXED CELL %: 14.4 %
NEUTROPHILS # BLD AUTO: 5.2 X10ˆ3/UL (ref 1.5–7.7)
NEUTROPHILS NFR BLD AUTO: 60.5 %
PLATELET # BLD AUTO: 245 X10ˆ3/UL (ref 150–450)
POTASSIUM BLD-SCNC: 3.8 MMOL/L (ref 3.6–5.1)
RBC # BLD AUTO: 4.2 X10ˆ6/UL
SODIUM BLD-SCNC: 141 MMOL/L (ref 136–145)
TROPONIN I BLD-MCNC: <0.02 NG/ML
WBC # BLD AUTO: 8.7 X10ˆ3/UL (ref 4–11)

## 2024-01-17 PROCEDURE — 84484 ASSAY OF TROPONIN QUANT: CPT

## 2024-01-17 PROCEDURE — 85025 COMPLETE CBC W/AUTO DIFF WBC: CPT | Performed by: EMERGENCY MEDICINE

## 2024-01-17 PROCEDURE — 93010 ELECTROCARDIOGRAM REPORT: CPT

## 2024-01-17 PROCEDURE — 99215 OFFICE O/P EST HI 40 MIN: CPT

## 2024-01-17 PROCEDURE — 80047 BASIC METABLC PNL IONIZED CA: CPT

## 2024-01-17 PROCEDURE — 71046 X-RAY EXAM CHEST 2 VIEWS: CPT | Performed by: EMERGENCY MEDICINE

## 2024-01-17 PROCEDURE — 93005 ELECTROCARDIOGRAM TRACING: CPT

## 2024-01-17 PROCEDURE — 36415 COLL VENOUS BLD VENIPUNCTURE: CPT

## 2024-01-17 NOTE — ED PROVIDER NOTES
Patient Seen in: [facility]      History     Chief Complaint   Patient presents with    Chest Pain     Stated Complaint: chest pain    Subjective:   HPI    32 yo female with chest pain since roman. Initially the pain was intermittent but has been constant over the last two days. She describes a left sided pressure that does not radiate. Some shortness of breath today. No recent illness. No recent trauma or immobilization. No smoker. No hormones.     Objective:   Past Medical History:   Diagnosis Date    Visual impairment     contact lenses and glasses              History reviewed. No pertinent surgical history.             No pertinent social history.            Review of Systems    Positive for stated complaint: chest pain  Other systems are as noted in HPI.  Constitutional and vital signs reviewed.      All other systems reviewed and negative except as noted above.    Physical Exam     ED Triage Vitals [01/17/24 1713]   /77   Pulse 83   Resp 18   Temp 98.6 °F (37 °C)   Temp src Temporal   SpO2 98 %   O2 Device None (Room air)       Current:/77   Pulse 83   Temp 98.6 °F (37 °C) (Temporal)   Resp 18   Ht 160 cm (5' 3\")   Wt 68.9 kg   SpO2 98%   Breastfeeding Yes   BMI 26.93 kg/m²         Physical Exam  Vitals and nursing note reviewed.   Constitutional:       Appearance: Normal appearance. She is well-developed.   HENT:      Head: Normocephalic and atraumatic.   Cardiovascular:      Rate and Rhythm: Normal rate and regular rhythm.      Heart sounds: Normal heart sounds.   Pulmonary:      Effort: Pulmonary effort is normal. No respiratory distress.      Breath sounds: Normal breath sounds.   Abdominal:      Palpations: Abdomen is soft.      Tenderness: There is no abdominal tenderness.   Musculoskeletal:      Cervical back: Normal range of motion and neck supple.      Right lower leg: No tenderness. No edema.      Left lower leg: No tenderness. No edema.   Skin:     General:  Skin is warm and dry.      Capillary Refill: Capillary refill takes less than 2 seconds.   Neurological:      General: No focal deficit present.      Mental Status: She is alert.      Sensory: No sensory deficit.   Psychiatric:         Mood and Affect: Mood normal.         Behavior: Behavior normal.              ED Course     Labs Reviewed   POCT CBC - Abnormal; Notable for the following components:       Result Value    # Mixed Cells 1.3 (*)     All other components within normal limits   POCT ISTAT CHEM8 CARTRIDGE - Normal   ISTAT TROPONIN - Normal     EKG    Rate, intervals and axes as noted on EKG Report.  Rate: 70  Rhythm: Sinus Rhythm  Reading: Normal                          MDM                                        Medical Decision Making    Chest wall pain vs ACS vs GERD vs PE. EKG reviewed by myself and is normal. CBC, Chemistry and Troponin all normal. Cxr images reviewed by myself and are normal. Patient is well appearing in IC. Normal VS. Discharge home on NSAIDS and follow up with Dr Hernandez next week.   Disposition and Plan     Clinical Impression:  1. Chest pain of uncertain etiology         Disposition:  Discharge  1/17/2024  6:25 pm    Follow-up:  Gema Hernandez DO  [address] [address] [phone]    In 1 day            Medications Prescribed:  Current Discharge Medication List

## 2024-01-17 NOTE — ED INITIAL ASSESSMENT (HPI)
Pt states since Xmas has been experiencing lt sided chest pain almost everyday.    Describes pain as an ache.   Denies any radiation of pain.   Denies n/v.    Feels a little sob.     Denies cough, uri symptoms.

## 2024-01-18 LAB
ATRIAL RATE: 70 BPM
P AXIS: 69 DEGREES
P-R INTERVAL: 144 MS
Q-T INTERVAL: 376 MS
QRS DURATION: 90 MS
QTC CALCULATION (BEZET): 406 MS
R AXIS: 40 DEGREES
T AXIS: 61 DEGREES
VENTRICULAR RATE: 70 BPM

## 2024-02-12 ENCOUNTER — TELEPHONE (OUTPATIENT)
Dept: OBGYN CLINIC | Facility: CLINIC | Age: 34
End: 2024-02-12

## 2024-02-12 RX ORDER — CLINDAMYCIN HYDROCHLORIDE 300 MG/1
300 CAPSULE ORAL 3 TIMES DAILY
Qty: 30 CAPSULE | Refills: 0 | Status: SHIPPED | OUTPATIENT
Start: 2024-02-12 | End: 2024-02-22

## 2024-02-12 NOTE — TELEPHONE ENCOUNTER
Pt calling with fever and breast issues. Believes it may be mastitis as she is breastfeeding but not sure cause baby is eating from breast.

## 2024-02-12 NOTE — TELEPHONE ENCOUNTER
Patient reports fever that started yesterday- was 102.0, using Tylenol.  Right breast is tender to the touch and feels warm.  Denies redness or streaking.  Reports chills and body aches.    Mastitis prescription pended for provider signature.  Patient has drug allergies- Clindamycin pended.  Instructed patient to continue to breastfeed- starting with infected breast first and use Tylenol/Ibuprofen for fever/pain.  Patient to call for appointment if symptoms persist or worsen.  Patient verbalized understanding.

## 2024-02-15 ENCOUNTER — OFFICE VISIT (OUTPATIENT)
Dept: FAMILY MEDICINE CLINIC | Facility: CLINIC | Age: 34
End: 2024-02-15
Payer: COMMERCIAL

## 2024-02-15 VITALS
OXYGEN SATURATION: 98 % | HEART RATE: 85 BPM | HEIGHT: 62.5 IN | WEIGHT: 155.19 LBS | SYSTOLIC BLOOD PRESSURE: 128 MMHG | DIASTOLIC BLOOD PRESSURE: 72 MMHG | TEMPERATURE: 98 F | RESPIRATION RATE: 16 BRPM | BODY MASS INDEX: 27.84 KG/M2

## 2024-02-15 DIAGNOSIS — Z00.00 ROUTINE GENERAL MEDICAL EXAMINATION AT A HEALTH CARE FACILITY: Primary | ICD-10-CM

## 2024-02-15 DIAGNOSIS — Z28.21 INFLUENZA VACCINATION DECLINED BY PATIENT: ICD-10-CM

## 2024-02-15 NOTE — PROGRESS NOTES
Chief Complaint   Patient presents with    Wellness Visit    Other     Pt would like to discuss chest discomfort was seen [facility] 1/17/24 for chest pain    Body ache and/or chills     Started on Sunday pain on right breast           HPI:   Leticia Ann is a 33 year old female       Shortness of breath/chest pain:  Patient seen at the [facility].  Patient states she had just 1 episode of this and has not had it since.      Mastitis:  Pt is lactating mother being treated empirically with clindamycin by her OB/GYN.  Pt reports greatly improved.        Symptoms: denies discharge, itching, burning or dysuria.     Last PAP: due soon  Abnormal PAP: denies h/o abnl pap        Wt Readings from Last 6 Encounters:   02/15/24 155 lb 3.2 oz (70.4 kg)   01/17/24 152 lb (68.9 kg)   07/13/23 153 lb (69.4 kg)   04/17/23 159 lb 2 oz (72.2 kg)   03/04/23 179 lb (81.2 kg)   03/03/23 179 lb (81.2 kg)     Body mass index is 27.93 kg/m².       Patient Active Problem List   Diagnosis    Multiple drug allergies     Current Outpatient Medications   Medication Sig Dispense Refill    clindamycin 300 MG Oral Cap Take 1 capsule (300 mg total) by mouth 3 (three) times daily for 10 days. 30 capsule 0    Prenatal Vit-Fe Fumarate-FA ( PRENATAL VITAMINS) 28-0.8 MG Oral Tab Take by mouth daily.        ibuprofen 600 MG Oral Tab Take 1 tablet (600 mg total) by mouth every 6 (six) hours as needed for Pain. (Patient not taking: Reported on 7/13/2023) 30 tablet 0      Past Medical History:   Diagnosis Date    Visual impairment     contact lenses and glasses      History reviewed. No pertinent surgical history.   Family History   Problem Relation Age of Onset    Hypertension Father     Lipids Father     Heart Disorder Father     No Known Problems Mother     Cancer Maternal Grandmother     No Known Problems Maternal Grandfather     Heart Disorder Paternal Grandmother     Hypertension Paternal Grandmother     No Known Problems Paternal  Grandfather     Cancer Paternal Aunt       Social History:   Social History     Socioeconomic History    Marital status:    Tobacco Use    Smoking status: Never    Smokeless tobacco: Never   Vaping Use    Vaping Use: Never used   Substance and Sexual Activity    Alcohol use: Not Currently    Drug use: Never    Sexual activity: Yes     Partners: Male   Other Topics Concern    Caffeine Concern Yes     Comment: 1-3 cups weekly    Exercise Yes    Seat Belt Yes     Social Determinants of Health     Financial Resource Strain: Low Risk  (3/4/2023)    Financial Resource Strain     Difficulty of Paying Living Expenses: Not hard at all     Med Affordability: No   Food Insecurity: No Food Insecurity (3/4/2023)    Food Insecurity     Food Insecurity: Never true   Transportation Needs: No Transportation Needs (3/4/2023)    Transportation Needs     Lack of Transportation: No   Stress: No Stress Concern Present (3/4/2023)    Stress     Feeling of Stress : No   Housing Stability: Low Risk  (3/4/2023)    Housing Stability     Housing Instability: No     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 Marital Status: . Children: 2.   Exercise: none.    Diet: watches fats closely and watches sugar closely     REVIEW OF SYSTEMS:   GENERAL: Overall feels well  SKIN: denies any unusual skin lesions or rashes  EYES: denies vision changes  HEENT: denies upper respiratory symptoms  LUNGS: denies cough or shortness of breath with exertion  CHEST: As in HPI  CARDIOVASCULAR: denies chest pain or tightness on exertion  VASCULAR: No lower extremity swelling  GI: denies abdominal pain, bowel movement changes, blood in stool  : No complaint of urinary problems, vaginal discharge or discomfort  MUSCULOSKELETAL: denies joint pain   NEURO: denies headaches or dizziness  PSYCH: denies depression or anxiety  ENDOCRINE: No complaints of temperature intolerance, polyuria, or excessive sweating.  LYMPHATICS: No complaints of swollen glands      EXAM:   BP  128/72   Pulse 85   Temp 97.8 °F (36.6 °C) (Temporal)   Resp 16   Ht 5' 2.5\" (1.588 m)   Wt 155 lb 3.2 oz (70.4 kg)   SpO2 98%   BMI 27.93 kg/m²   Body mass index is 27.93 kg/m².   GENERAL: NAD, Pleasant  female who appears somewhat sleepy or fatigued  SKIN: No visible rashes or suspicious lesions.  HEENT: atraumatic, normocephalic, EACs and TMs clear normal bilaterally.  Nose: No nasal discharge.  OP: MMM.  Posteriorly no exudate or erythema.  EYES: PERRL, EOMI, sclera, conjunctiva are clear  NECK: supple, no adenopathy/thyromegaly/masses  LUNGS: Clear to auscultation bilateral, no rales, rhonchi or wheezing  CARDIO: RRR without murmur normal S1S2  ABD:  Soft, non tender to palpation.  No masses, HSM, or pulsations appreciated.  MUSCULOSKELETAL: gait normal, no gross M/S defect.  EXTREMITIES: no clubbing, cyanosis, or edema  NEURO: Alert and oriented x3.  No gross motor or sensory deficit.  PSYCH: normal affect      Immunization History   Administered Date(s) Administered    MMR 10/31/2020    TDAP 08/07/2020, 12/15/2022   Pended Date(s) Pended    Influenza Vaccine Refused 02/15/2024       DATA:    XR CHEST PA + LAT CHEST (CPT=71046)    Result Date: 1/17/2024  CONCLUSION:  No evidence of active cardiopulmonary disease.   LOCATION:  [facility]   Dictated by (CST): Arnold Buckley MD on 1/17/2024 at 6:19 PM     Finalized by (CST): Arnold Buckley MD on 1/17/2024 at 6:20 PM        Results for orders placed or performed during the hospital encounter of 01/17/24   POCT CBC    Collection Time: 01/17/24  5:44 PM   Result Value Ref Range    WBC IC 8.7 4.0 - 11.0 x10ˆ3/uL    RBC IC 4.20 3.80 - 5.30 X10ˆ6/uL    HGB IC 12.3 12.0 - 16.0 g/dL    HCT IC 38.1 35.0 - 48.0 %    MCV IC 90.7 80.0 - 100.0 fL    MCH IC 29.3 26.0 - 34.0 pg    MCHC IC 32.3 31.0 - 37.0 g/dL    PLT .0 150.0 - 450.0 X10ˆ3/uL    # Neutrophil 5.2 1.5 - 7.7 X10ˆ3/uL    # Lymphocyte 2.2 1.0 - 4.0 X10ˆ3/uL    # Mixed Cells 1.3 (H) 0.1 - 1.0 X10ˆ3/uL     Neutrophil % 60.5 %    Lymphocyte % 25.1 %    Mixed Cell % 14.4 %   EKG 12 Lead    Collection Time: 01/17/24  5:48 PM   Result Value Ref Range    Ventricular rate 70 BPM    Atrial rate 70 BPM    P-R Interval 144 ms    QRS Duration 90 ms    Q-T Interval 376 ms    QTC Calculation (Bezet) 406 ms    P Axis 69 degrees    R Axis 40 degrees    T Axis 61 degrees   POCT ISTAT chem8 cartridge    Collection Time: 01/17/24  5:52 PM   Result Value Ref Range    ISTAT Sodium 141 136 - 145 mmol/L    ISTAT BUN 18 7 - 18 mg/dL    ISTAT Potassium 3.8 3.6 - 5.1 mmol/L    ISTAT Chloride 105 98 - 112 mmol/L    ISTAT Ionized Calcium 1.24 1.12 - 1.32 mmol/L    ISTAT Hematocrit 37 34 - 50 %    ISTAT Glucose 95 70 - 99 mg/dL    ISTAT TCO2 25 21 - 32 mmol/L    ISTAT Creatinine 0.60 0.55 - 1.02 mg/dL    eGFR-Cr 122 >=60 mL/min/1.73m2   ISTAT Troponin    Collection Time: 01/17/24  5:54 PM   Result Value Ref Range    ISTAT Troponin <0.02 <0.045 ng/mL ng/mL           ASSESSMENT AND PLAN:   Leticia Ann is a 33 year old female who presents for a complete physical exam.        Encounter Diagnoses   Name Primary?    Routine general medical examination at a health care facility Yes    Lactating mother     Influenza vaccination declined by patient          1. Routine general medical examination at a health care facility  Patient provided handout on women's health and prevention.   Routine health profile labs pending.  Recommend healthy diet including green leafy vegetables, fresh fruits and lean protein.  Aerobic exercise 30 minutes five days a week for cardiovascular fitness and 45-60 minutes 6-7 days a week for weight loss.     - Lipid Panel; Future  - Assay, Thyroid Stim Hormone; Future  - Free T4, (Free Thyroxine); Future  - Hepatic Function Panel (7) [E]; Future    2. Lactating mother  Pt is lactating mother being treated empirically with clindamycin by her OB/GYN.  Pt reports greatly improved.  Patient was encouraged to complete the full  10-day course of clindamycin.    3. Influenza vaccination declined by patient  - Influenza Vaccine Refused (Order that documents the process)          Orders Placed This Encounter   Procedures    Lipid Panel    Assay, Thyroid Stim Hormone    Free T4, (Free Thyroxine)    Hepatic Function Panel (7) [E]    Influenza Vaccine Refused (Order that documents the process)           Imaging & Consults:  INFLUENZA REFUSED [facility]    Return in about 1 year (around 2/15/2025) for Annual wellness visit.  Sooner if needed..

## 2024-02-24 ENCOUNTER — LAB ENCOUNTER (OUTPATIENT)
Dept: LAB | Age: 34
End: 2024-02-24
Attending: FAMILY MEDICINE
Payer: COMMERCIAL

## 2024-02-24 DIAGNOSIS — Z00.00 ROUTINE GENERAL MEDICAL EXAMINATION AT A HEALTH CARE FACILITY: ICD-10-CM

## 2024-02-24 LAB
ALBUMIN SERPL-MCNC: 4 G/DL (ref 3.4–5)
ALP LIVER SERPL-CCNC: 74 U/L
ALT SERPL-CCNC: 17 U/L
AST SERPL-CCNC: 7 U/L (ref 15–37)
BILIRUB DIRECT SERPL-MCNC: 0.1 MG/DL (ref 0–0.2)
BILIRUB SERPL-MCNC: 0.6 MG/DL (ref 0.1–2)
CHOLEST SERPL-MCNC: 242 MG/DL (ref ?–200)
FASTING PATIENT LIPID ANSWER: NO
HDLC SERPL-MCNC: 58 MG/DL (ref 40–59)
LDLC SERPL CALC-MCNC: 177 MG/DL (ref ?–100)
NONHDLC SERPL-MCNC: 184 MG/DL (ref ?–130)
PROT SERPL-MCNC: 7.9 G/DL (ref 6.4–8.2)
T4 FREE SERPL-MCNC: 0.8 NG/DL (ref 0.8–1.7)
TRIGL SERPL-MCNC: 48 MG/DL (ref 30–149)
TSI SER-ACNC: 2.26 MIU/ML (ref 0.36–3.74)
VLDLC SERPL CALC-MCNC: 10 MG/DL (ref 0–30)

## 2024-02-24 PROCEDURE — 84439 ASSAY OF FREE THYROXINE: CPT

## 2024-02-24 PROCEDURE — 36415 COLL VENOUS BLD VENIPUNCTURE: CPT

## 2024-02-24 PROCEDURE — 84443 ASSAY THYROID STIM HORMONE: CPT

## 2024-02-24 PROCEDURE — 80076 HEPATIC FUNCTION PANEL: CPT

## 2024-02-24 PROCEDURE — 80061 LIPID PANEL: CPT

## 2024-03-28 ENCOUNTER — OFFICE VISIT (OUTPATIENT)
Dept: FAMILY MEDICINE CLINIC | Facility: CLINIC | Age: 34
End: 2024-03-28
Payer: COMMERCIAL

## 2024-03-28 VITALS
HEIGHT: 62.5 IN | RESPIRATION RATE: 18 BRPM | SYSTOLIC BLOOD PRESSURE: 108 MMHG | HEART RATE: 80 BPM | BODY MASS INDEX: 27.45 KG/M2 | WEIGHT: 153 LBS | DIASTOLIC BLOOD PRESSURE: 66 MMHG | OXYGEN SATURATION: 98 % | TEMPERATURE: 98 F

## 2024-03-28 DIAGNOSIS — Z82.49 FAMILY HISTORY OF PREMATURE CORONARY ARTERY DISEASE: ICD-10-CM

## 2024-03-28 DIAGNOSIS — E78.00 HYPERCHOLESTEROLEMIA: Primary | ICD-10-CM

## 2024-03-28 NOTE — PROGRESS NOTES
Leticia Ann is a 33 year old female.     Chief Complaint   Patient presents with    Lab Results     F/u    Hyperlipidemia         HPI:       Hypercholesterolemia:  Pt states she changed her diet as soon as she saw the results.  Father had coronary artery stent age 42 yo, around age 54 yo had CABG x3.  Paternal grandmother also had had CABG.  Denies fam h/o stroke.            Wt Readings from Last 6 Encounters:   03/28/24 153 lb (69.4 kg)   02/15/24 155 lb 3.2 oz (70.4 kg)   01/17/24 152 lb (68.9 kg)   07/13/23 153 lb (69.4 kg)   04/17/23 159 lb 2 oz (72.2 kg)   03/04/23 179 lb (81.2 kg)      Body mass index is 27.54 kg/m².        Current Outpatient Medications   Medication Sig Dispense Refill    Prenatal Vit-Fe Fumarate-FA ( PRENATAL VITAMINS) 28-0.8 MG Oral Tab Take by mouth daily.   (Patient not taking: Reported on 3/28/2024)        Past Medical History:   Diagnosis Date    Visual impairment     contact lenses and glasses      History reviewed. No pertinent surgical history.   Social History:    Social History     Socioeconomic History    Marital status:    Tobacco Use    Smoking status: Never    Smokeless tobacco: Never   Vaping Use    Vaping Use: Never used   Substance and Sexual Activity    Alcohol use: Not Currently    Drug use: Never    Sexual activity: Yes     Partners: Male   Other Topics Concern    Caffeine Concern Yes     Comment: 1-3 cups weekly    Exercise Yes    Seat Belt Yes     Social Determinants of Health     Financial Resource Strain: Low Risk  (3/4/2023)    Financial Resource Strain     Difficulty of Paying Living Expenses: Not hard at all     Med Affordability: No   Food Insecurity: No Food Insecurity (3/4/2023)    Food Insecurity     Food Insecurity: Never true   Transportation Needs: No Transportation Needs (3/4/2023)    Transportation Needs     Lack of Transportation: No   Stress: No Stress Concern Present (3/4/2023)    Stress     Feeling of Stress : No   Housing Stability:  Low Risk  (3/4/2023)    Housing Stability     Housing Instability: No         Family History   Problem Relation Age of Onset    Hypertension Father     Lipids Father     Heart Disorder Father     No Known Problems Mother     Cancer Maternal Grandmother     No Known Problems Maternal Grandfather     Heart Disorder Paternal Grandmother     Hypertension Paternal Grandmother     No Known Problems Paternal Grandfather     Cancer Paternal Aunt      REVIEW OF SYSTEMS:   GENERAL HEALTH: Overall feels well.   RESPIRATORY: Denies: NO/CERON  CARDIOVASCULAR: Denies CP/palpitations  VASCULAR: No complaints of LE edema  GI: No complaints of abdominal pain/nausea/vomiting/diarrhea  NEURO: denies headaches/dizziness  PSYCH: denies depression and anxiety    Immunization History   Administered Date(s) Administered    MMR 10/31/2020    TDAP 08/07/2020, 12/15/2022   Pended Date(s) Pended    Influenza Vaccine Refused 02/15/2024       EXAM:   /66   Pulse 80   Temp 97.7 °F (36.5 °C) (Temporal)   Resp 18   Ht 5' 2.5\" (1.588 m)   Wt 153 lb (69.4 kg)   LMP 02/24/2024 (Approximate)   SpO2 98%   BMI 27.54 kg/m²   GENERAL: NAD, pleasant not acutely ill-appearing  female  SKIN: no visible rashes  HEAD: NCAT  VASCULAR: No lower extremity edema  EXTREMITIES: no cyanosis or clubbing  NEURO: Alert and Oriented x3.  No gross motor abnormalities.  PSYCH: Affect normal.  Normal thought content.        DATA:    Component      Latest Ref Rng 6/15/2019 2/24/2024   Cholesterol, Total      <200 mg/dL 194  242 (H)    HDL Cholesterol      40 - 59 mg/dL 47  58    Triglycerides      30 - 149 mg/dL 74  48    LDL Cholesterol Calc      <100 mg/dL 132 (H)  177 (H)    VLDL      0 - 30 mg/dL 15  10    NON-HDL CHOLESTEROL      <130 mg/dL 147 (H)  184 (H)    Patient Fasting for Lipid?  No          ASSESSMENT AND PLAN:       Encounter Diagnoses   Name Primary?    Hypercholesterolemia Yes    Family history of premature coronary artery disease           1. Hypercholesterolemia  LDL markedly increased compared to in 2019.  Patient is already started working on her diet.  Recommend reevaluate lipid panel in 2 months.  Follow-up pending results.    - Lipid Panel [E]; Future    2. Family history of premature coronary artery disease  Father coronary artery stent age 43 years old. CABG x 3 age 55 years old. Paternal grandmother with CABG as well.         Orders Placed This Encounter   Procedures    Lipid Panel [E]         Return in about 2 months (around 5/28/2024) for Pending lipid panel results.

## 2024-05-24 ENCOUNTER — TELEPHONE (OUTPATIENT)
Dept: FAMILY MEDICINE CLINIC | Facility: CLINIC | Age: 34
End: 2024-05-24

## 2024-05-24 ENCOUNTER — OFFICE VISIT (OUTPATIENT)
Dept: FAMILY MEDICINE CLINIC | Facility: CLINIC | Age: 34
End: 2024-05-24

## 2024-05-24 VITALS
HEIGHT: 62.5 IN | SYSTOLIC BLOOD PRESSURE: 114 MMHG | WEIGHT: 150 LBS | TEMPERATURE: 98 F | OXYGEN SATURATION: 98 % | HEART RATE: 103 BPM | DIASTOLIC BLOOD PRESSURE: 74 MMHG | BODY MASS INDEX: 26.91 KG/M2 | RESPIRATION RATE: 18 BRPM

## 2024-05-24 DIAGNOSIS — J11.1 INFLUENZA-LIKE ILLNESS: Primary | ICD-10-CM

## 2024-05-24 DIAGNOSIS — J40 BRONCHITIS WITH ACUTE WHEEZING: ICD-10-CM

## 2024-05-24 DIAGNOSIS — H92.03 OTALGIA OF BOTH EARS: ICD-10-CM

## 2024-05-24 RX ORDER — ALBUTEROL SULFATE 90 UG/1
1-2 AEROSOL, METERED RESPIRATORY (INHALATION) EVERY 6 HOURS PRN
Qty: 1 EACH | Refills: 0 | Status: SHIPPED | OUTPATIENT
Start: 2024-05-24 | End: 2024-06-03

## 2024-05-24 NOTE — PROGRESS NOTES
HPI:   Leticia Ann is a 33 year old female who presents with ill symptoms for  1  weeks. Patient reports began with low grade fever on 100-100.3, sore throat, mildly productive cough, headache and congestion, now with pressure at temples/ear pain bilaterally, congestion Concern for sinus infection. Has tried multi symptom medication with not much relief. 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    Current Outpatient Medications   Medication Sig Dispense Refill     No current facility-administered medications for this visit.      Past Medical History:    Visual impairment    contact lenses and glasses      No past surgical history on file.   Family History   Problem Relation Age of Onset    Hypertension Father     Lipids Father     Heart Disorder Father     No Known Problems Mother     Cancer Maternal Grandmother     No Known Problems Maternal Grandfather     Heart Disorder Paternal Grandmother     Hypertension Paternal Grandmother     No Known Problems Paternal Grandfather     Cancer Paternal Aunt       Social History     Socioeconomic History    Marital status:    Tobacco Use    Smoking status: Never    Smokeless tobacco: Never   Vaping Use    Vaping status: Never Used   Substance and Sexual Activity    Alcohol use: Not Currently    Drug use: Never    Sexual activity: Yes     Partners: Male   Other Topics Concern    Caffeine Concern Yes     Comment: 1-3 cups weekly    Exercise Yes    Seat Belt Yes     Social Determinants of Health     Financial Resource Strain: Low Risk  (3/4/2023)    Financial Resource Strain     Difficulty of Paying Living Expenses: Not hard at all     Med Affordability: No   Food Insecurity: No Food Insecurity (3/4/2023)    Food Insecurity     Food Insecurity: Never true   Transportation Needs: No Transportation Needs (3/4/2023)    Transportation Needs     Lack of Transportation: No   Stress: No Stress Concern Present (3/4/2023)    Stress     Feeling of Stress : No    Housing Stability: Low Risk  (3/4/2023)    Housing Stability     Housing Instability: No         REVIEW OF SYSTEMS:   GENERAL: feels well otherwise  HEENT: congested, as above in HPI  LUNGS: denies shortness of breath with exertion  CARDIOVASCULAR: denies chest pain on exertion  GI: no nausea or abdominal pain, appetite down due to symptoms  NEURO: denies current headaches    EXAM:   /74   Pulse 103   Temp 98.3 °F (36.8 °C)   Resp 18   Ht 5' 2.5\" (1.588 m)   Wt 150 lb (68 kg)   LMP 04/28/2024 (Exact Date)   SpO2 98%   Breastfeeding No   BMI 27.00 kg/m²   GENERAL: well developed, well nourished,in no apparent distress, appears congested with frequent sniffling and fatigued but non toxic appearing  HEENT: atraumatic, normocephalic,ears full and clear, nares with mild inflammation, throat pink, no exudate. Uvuvla midline.  No sinus tenderness with palpation.  NECK: supple,no adenopathy  LUNGS: wheezing in upper lobes bilaterally, breathing is non labored  CARDIO: RRR without murmur      ASSESSMENT AND PLAN:    PLAN:Leticia was seen today for sinus problem.    Diagnoses and all orders for this visit:    Influenza-like illness    Otalgia of both ears    Bronchitis with acute wheezing    Other orders  -     albuterol 108 (90 Base) MCG/ACT Inhalation Aero Soln; Inhale 1-2 puffs into the lungs every 6 (six) hours as needed for Wheezing.      Meds as above. Discussed unlikely bacterial sinus infection, can use Flonase if needed but patient declines due to epistaxis in past. Inhaler use discussed. Self care discussed. Medication use and risk/benefit discussed. Patient is advised to follow up with PCP if not improving with treatment plan or seek immediate care if symptoms worsen.  The patient indicates understanding of these issues and agrees to the plan.  Patient Instructions   Albuerol inhaler 1-2 puffs every 6 hours as needed for cough/wheezing.  Rest, fluids, hot steam inhalation, warm pack to  face/temples as needed.  May take Ibuprofen or Tylenol if needed for pain.  Stop multi symptom medication as this is too drying.  My use guafinesin for thinning mucus if desired.  Follow up with Dr. Hernandez next week if symptoms worsen or do not fully improve. Seek immediate care if symptoms markedly worsen.

## 2024-05-24 NOTE — PATIENT INSTRUCTIONS
Albuerol inhaler 1-2 puffs every 6 hours as needed for cough/wheezing.  Rest, fluids, hot steam inhalation, warm pack to face/temples as needed.  May take Ibuprofen or Tylenol if needed for pain.  Stop multi symptom medication as this is too drying.  My use guafinesin for thinning mucus if desired.  Follow up with Dr. Hernandez next week if symptoms worsen or do not fully improve. Seek immediate care if symptoms markedly worsen.

## 2024-05-25 ENCOUNTER — LAB ENCOUNTER (OUTPATIENT)
Dept: LAB | Age: 34
End: 2024-05-25
Attending: FAMILY MEDICINE

## 2024-05-25 DIAGNOSIS — E78.00 HYPERCHOLESTEROLEMIA: ICD-10-CM

## 2024-05-25 LAB
CHOLEST SERPL-MCNC: 195 MG/DL (ref ?–200)
FASTING PATIENT LIPID ANSWER: YES
HDLC SERPL-MCNC: 49 MG/DL (ref 40–59)
LDLC SERPL CALC-MCNC: 129 MG/DL (ref ?–100)
NONHDLC SERPL-MCNC: 146 MG/DL (ref ?–130)
TRIGL SERPL-MCNC: 91 MG/DL (ref 30–149)
VLDLC SERPL CALC-MCNC: 16 MG/DL (ref 0–30)

## 2024-05-25 PROCEDURE — 36415 COLL VENOUS BLD VENIPUNCTURE: CPT

## 2024-05-25 PROCEDURE — 80061 LIPID PANEL: CPT

## 2024-05-27 ENCOUNTER — HOSPITAL ENCOUNTER (OUTPATIENT)
Age: 34
Discharge: HOME | End: 2024-05-27
Attending: EMERGENCY MEDICINE

## 2024-05-27 ENCOUNTER — OFFICE VISIT (OUTPATIENT)
Dept: FAMILY MEDICINE CLINIC | Facility: CLINIC | Age: 34
End: 2024-05-27

## 2024-05-27 VITALS
DIASTOLIC BLOOD PRESSURE: 80 MMHG | SYSTOLIC BLOOD PRESSURE: 113 MMHG | TEMPERATURE: 98 F | HEIGHT: 62.5 IN | RESPIRATION RATE: 18 BRPM | WEIGHT: 150 LBS | HEART RATE: 98 BPM | OXYGEN SATURATION: 97 % | BODY MASS INDEX: 26.91 KG/M2

## 2024-05-27 VITALS
TEMPERATURE: 98 F | RESPIRATION RATE: 20 BRPM | BODY MASS INDEX: 26.91 KG/M2 | DIASTOLIC BLOOD PRESSURE: 63 MMHG | WEIGHT: 150 LBS | OXYGEN SATURATION: 100 % | HEART RATE: 89 BPM | SYSTOLIC BLOOD PRESSURE: 99 MMHG | HEIGHT: 62.5 IN

## 2024-05-27 DIAGNOSIS — H65.00 ACUTE SEROUS OTITIS MEDIA, RECURRENCE NOT SPECIFIED, UNSPECIFIED LATERALITY: Primary | ICD-10-CM

## 2024-05-27 DIAGNOSIS — R51.9 ACUTE NONINTRACTABLE HEADACHE, UNSPECIFIED HEADACHE TYPE: Primary | ICD-10-CM

## 2024-05-27 PROCEDURE — 99213 OFFICE O/P EST LOW 20 MIN: CPT

## 2024-05-27 PROCEDURE — 99212 OFFICE O/P EST SF 10 MIN: CPT

## 2024-05-27 NOTE — ED INITIAL ASSESSMENT (HPI)
Bilateral ear pain started Friday .    Went to [facility] on Friday and this morning she was told she has fluid min her ears RX inhaler.

## 2024-05-27 NOTE — PROGRESS NOTES
CHIEF COMPLAINT:     Chief Complaint   Patient presents with    Ear Pain     L/R ear pain for 3 days.  OTC meds taken.  With head aches and jaw pain.           HPI:     Leticia Ann is a 33 year old female presents with complaints of headache x 3 days. Patient is tearful as she reports her history. States she was seen on 5/24, reported ear pain at that time, Was told she had viral illness and had fluid in her ears. She is using tylenol, states headache and ear pain worsening the past 2 days. Today it has become \"unbearable.\" Reports pain is 10/10 in severity, worst headache in her entire life.  She reports her sinus congestion and cough is improving. No fevers the past few days. Has pain across her forehead radiating to both ears and jaw. One side is not worse than the other. Admits nausea, no photophobia. Treating with tylenol without any improvement.          Current Outpatient Medications   Medication Sig Dispense Refill    albuterol 108 (90 Base) MCG/ACT Inhalation Aero Soln Inhale 1-2 puffs into the lungs every 6 (six) hours as needed for Wheezing. 1 each 0      Past Medical History:    Visual impairment    contact lenses and glasses      Social History:  Social History     Socioeconomic History    Marital status:    Tobacco Use    Smoking status: Never    Smokeless tobacco: Never   Vaping Use    Vaping status: Never Used   Substance and Sexual Activity    Alcohol use: Not Currently    Drug use: Never    Sexual activity: Yes     Partners: Male   Other Topics Concern    Caffeine Concern Yes     Comment: 1-3 cups weekly    Exercise Yes    Seat Belt Yes     Social Determinants of Health     Financial Resource Strain: Low Risk  (3/4/2023)    Financial Resource Strain     Difficulty of Paying Living Expenses: Not hard at all     Med Affordability: No   Food Insecurity: No Food Insecurity (3/4/2023)    Food Insecurity     Food Insecurity: Never true   Transportation Needs: No Transportation Needs  (3/4/2023)    Transportation Needs     Lack of Transportation: No   Stress: No Stress Concern Present (3/4/2023)    Stress     Feeling of Stress : No   Housing Stability: Low Risk  (3/4/2023)    Housing Stability     Housing Instability: No        REVIEW OF SYSTEMS:   GENERAL:  Denies fever, chills,weight change, decreased appetite.  SKIN: Denies rashes, skin wounds or ulcers.  EYES: Denies blurred vision or double vision  HENT: Denies facial weakness,Denies  tinnitus.  CHEST: Denies chest pain, or palpitations  LUNGS: Denies shortness of breath, cough, or wheezing  GI: Denies abdominal pain, admits nausea, no vomiting.   MUSCULOSKELETAL: no arthralgia or swollen joints  LYMPH:  Denies lymphadenopathy  NEURO:  Headaches as described above.  Denies lightheadedness.  No seizures, no confusion, no weakness, no abnormal sensation, no vertigo.    EXAM:   /80   Pulse 98   Temp 98.2 °F (36.8 °C) (Oral)   Resp 18   Ht 5' 2.5\" (1.588 m)   Wt 150 lb (68 kg)   LMP 05/26/2024 (Exact Date)   SpO2 97%   BMI 27.00 kg/m²   GENERAL: well developed, well nourished. Patient is crying throughout entire visit.   EYES: Pupils round, reactive to light and accomodation.  EOMI.    HENT: Right TM gray, no bulging, no fluid. Left TM pink, no bulging. Nasal mucosa erythematous.   NECK: supple, no bruits  LUNGS: clear to auscultation bilaterally, breathing is non labored  CARDIO: RRR without murmur  NEURO: Alert & Oriented x 3.  CN II-XII intact. Moving all 4 extremities without difficulty. No facial droop.  No slurred speech.  Patella & Brachioradialis DTR intact bilaterally.      EXTREMITIES: no cyanosis, clubbing or edema  LYMPH: No cervical or supraclavicular lymphadenopathy.       ASSESSMENT AND PLAN:   ASSESSMENT: Acute headache  PLAN:   Discussed possible sinusitis given location of pain and recent viral respiratory illness, left ear is pink with fluid level. However, patient is reporting sever 10/10 pain, worst headache  she has had in her entire life. She is crying throughout the visit stating the pain is too severe and unbearable. States needs help with pain control.  Due to severity of pain, recommended evaluation at higher level of care. Provided with location of [facility]. Pt states she is unsure what location she wants to go to, will discuss with her  fist.

## 2024-05-27 NOTE — DISCHARGE INSTRUCTIONS
Follow-up with your primary care doctor  Take Sudafed over-the-counter for decongestant  Use lidocaine eardrops every 2-3 hours as needed  Return if any worsening symptoms or new concerns

## 2024-05-27 NOTE — ED PROVIDER NOTES
Patient Seen in: [facility]      History     Chief Complaint   Patient presents with    Ear Pain     Stated Complaint: ear ache    Subjective:   HPI    33-year-old female presents to the immediate care for complaints of ear pain.  She complains of left ear pain at this time.  She went to a walk-in clinic on Friday.  She states that she was told that she has minimal fluid.  She also has some rhinorrhea congestion cough.  She was prescribed an inhaler.  Patient denies any fevers or chills.  Denies any trauma to the ear.  Denies any purulent drainage.      Objective:   Past Medical History:    Visual impairment    contact lenses and glasses              History reviewed. No pertinent surgical history.             No pertinent social history.            Review of Systems    Positive for stated complaint: ear ache  Other systems are as noted in HPI.  Constitutional and vital signs reviewed.      All other systems reviewed and negative except as noted above.    Physical Exam     ED Triage Vitals [05/27/24 1219]   BP 99/63   Pulse 89   Resp 20   Temp 97.7 °F (36.5 °C)   Temp src Temporal   SpO2 100 %   O2 Device        Current Vitals:   Vital Signs  BP: 99/63  Pulse: 89  Resp: 20  Temp: 97.7 °F (36.5 °C)  Temp src: Temporal    Oxygen Therapy  SpO2: 100 %            Physical Exam     General: Alert and oriented. No acute distress.  HEENT: Normocephalic. No evidence of trauma. Extraocular movements are intact.  Bilateral tympanic membranes within normal limits.  No perforation injection erythema.  There is some serous fluid noted  Cardiovascular exam: Regular rate and rhythm  Lungs: Clear to auscultation bilaterally.  Abdomen: Soft, nondistended, nontender.  Extremities: No evidence of deformity. No clubbing or cyanosis.  Neuro: No focal deficit is noted.    ED Course   Labs Reviewed - No data to display  Patient's clinical history exam findings appear to be consistent with a serous otitis media.  Recommend  over-the-counter decongestant such as Sudafed.  She was given lidocaine eardrops here.  She will be discharged home to follow-up with her primary care doctor         [facility]   Patient was screened and evaluated during this visit.   As a treating physician attending to the patient, I determined, within reasonable clinical confidence and prior to discharge, that an emergency medical condition was not or was no longer present.  There was no indication for further evaluation, treatment or admission on an emergency basis.  Comprehensive verbal and written discharge and follow-up instructions were provided to help prevent relapse or worsening.  Patient was instructed to follow-up with her primary care provider for further evaluation and treatment, but to return immediately to the ER for worsening, concerning, new, changing or persisting symptoms.  I discussed the case with the patient and they had no questions, complaints, or concerns.  Patient felt comfortable going home.    ^^Please note that this report has been produced using speech recognition software and may contain errors related to that system including, but not limited to, errors in grammar, punctuation, and spelling, as well as words and phrases that possibly may have been recognized inappropriately.  If there are any questions or concerns, contact the dictating provider for clarification                                 [facility]    Disposition and Plan     Clinical Impression:  1. Acute serous otitis media, recurrence not specified, unspecified laterality         Disposition:  Discharge  5/27/2024 12:30 pm    Follow-up:  Gema Hernandez DO  [id number] [address] [address] [phone]    Call   As needed, If symptoms worsen          Medications Prescribed:  Current Discharge Medication List

## 2024-05-30 ENCOUNTER — OFFICE VISIT (OUTPATIENT)
Dept: FAMILY MEDICINE CLINIC | Facility: CLINIC | Age: 34
End: 2024-05-30

## 2024-05-30 VITALS
BODY MASS INDEX: 27.45 KG/M2 | HEIGHT: 62.5 IN | DIASTOLIC BLOOD PRESSURE: 64 MMHG | TEMPERATURE: 98 F | WEIGHT: 153 LBS | SYSTOLIC BLOOD PRESSURE: 102 MMHG | OXYGEN SATURATION: 98 % | RESPIRATION RATE: 18 BRPM

## 2024-05-30 DIAGNOSIS — J06.9 VIRAL UPPER RESPIRATORY TRACT INFECTION: ICD-10-CM

## 2024-05-30 DIAGNOSIS — E78.00 HYPERCHOLESTEROLEMIA: Primary | ICD-10-CM

## 2024-05-30 PROCEDURE — 99213 OFFICE O/P EST LOW 20 MIN: CPT | Performed by: FAMILY MEDICINE

## 2024-05-30 PROCEDURE — 3074F SYST BP LT 130 MM HG: CPT | Performed by: FAMILY MEDICINE

## 2024-05-30 PROCEDURE — 3008F BODY MASS INDEX DOCD: CPT | Performed by: FAMILY MEDICINE

## 2024-05-30 PROCEDURE — 3078F DIAST BP <80 MM HG: CPT | Performed by: FAMILY MEDICINE

## 2024-07-30 ENCOUNTER — OFFICE VISIT (OUTPATIENT)
Dept: OBGYN CLINIC | Facility: CLINIC | Age: 34
End: 2024-07-30
Payer: COMMERCIAL

## 2024-07-30 VITALS
BODY MASS INDEX: 27.88 KG/M2 | SYSTOLIC BLOOD PRESSURE: 110 MMHG | DIASTOLIC BLOOD PRESSURE: 68 MMHG | WEIGHT: 155.38 LBS | HEART RATE: 81 BPM | HEIGHT: 62.5 IN

## 2024-07-30 DIAGNOSIS — Z12.4 SCREENING FOR CERVICAL CANCER: ICD-10-CM

## 2024-07-30 DIAGNOSIS — Z11.51 SCREENING FOR HUMAN PAPILLOMAVIRUS (HPV): ICD-10-CM

## 2024-07-30 DIAGNOSIS — Z01.419 WELL WOMAN EXAM WITH ROUTINE GYNECOLOGICAL EXAM: Primary | ICD-10-CM

## 2024-07-30 PROCEDURE — 87624 HPV HI-RISK TYP POOLED RSLT: CPT | Performed by: NURSE PRACTITIONER

## 2024-07-30 PROCEDURE — 99459 PELVIC EXAMINATION: CPT | Performed by: NURSE PRACTITIONER

## 2024-07-30 PROCEDURE — 3078F DIAST BP <80 MM HG: CPT | Performed by: NURSE PRACTITIONER

## 2024-07-30 PROCEDURE — 88175 CYTOPATH C/V AUTO FLUID REDO: CPT | Performed by: NURSE PRACTITIONER

## 2024-07-30 PROCEDURE — 3074F SYST BP LT 130 MM HG: CPT | Performed by: NURSE PRACTITIONER

## 2024-07-30 PROCEDURE — 99395 PREV VISIT EST AGE 18-39: CPT | Performed by: NURSE PRACTITIONER

## 2024-07-30 PROCEDURE — 3008F BODY MASS INDEX DOCD: CPT | Performed by: NURSE PRACTITIONER

## 2024-07-30 NOTE — PROGRESS NOTES
Subjective:  Chief Complaint   Patient presents with    Annual     33 year old female  presents for annual.    Notes some stress incontinence with sneezing    Patient's last menstrual period was 2024 (exact date).  Hx Prior Abnormal Pap: No  Pap Date: 21  Pap Result Notes: wnl  Menarche: 15 (2024  2:25 PM)  Period Cycle (Days): 28-31 days (2024  2:25 PM)  Period Duration (Days): 7-9 days (2024  2:25 PM)  Period Flow: Heavy to moderate (2024  2:25 PM)  Use of Birth Control (if yes, specify type): N/A (2024  2:25 PM)  Hx Prior Abnormal Pap: No (2024  2:25 PM)  Pap Date: 21 (2024  2:25 PM)  Pap Result Notes: wnl (2024  2:25 PM)         Declines STD screen  Contraception: none, happy with this method    Denies family history of breast, ovarian CA.  MGM with history of colon CA    Feeling safe at home.    Most Recent Immunizations   Administered Date(s) Administered    MMR 10/31/2020    TDAP 12/15/2022   Pended Date(s) Pended    Influenza Vaccine Refused 02/15/2024      reports that she has never smoked. She has never used smokeless tobacco.   reports current alcohol use of about 3.0 standard drinks of alcohol per week.    Past Medical History:    Visual impairment    contact lenses and glasses     History reviewed. No pertinent surgical history.    Review of Systems:  Pertinent items are noted in the HPI.    Objective:  /68   Pulse 81   Ht 62.5\"   Wt 155 lb 6 oz (70.5 kg)   LMP 2024 (Exact Date)   BMI 27.97 kg/m²    Physical Examination:  General appearance: Well dressed, well nourished in no apparent distress  Neurologic/Psychiatric: Alert and oriented to person, place and time, mood normal, affect appropriate  Head: Normocephalic without obvious deformity, atraumatic  Neck: No thyromegaly, supple, non-tender, no masses, no adenopathy  Lungs: Clear to auscultation bilaterally, no rales, wheezes or rhonchi  Breasts: Symmetric, non-tender,  no masses, lesions, retraction, dimpling or discharge bilaterally, no axillary or supraclavicular lymphadenopathy  Heart: Regular rate and rhythm, no gallops or murmurs  Abdomen: Soft, non-tender, non-distended, no masses, no hepatosplenomegaly, no hernias, no inguinal lymphadenopathy  Pelvic:    External genitalia- Normal, Bartholin's, urethra, skeins glands normal   Vagina- No vaginal lesions, physiologic discharge   Cervix- No lesions, long/closed, no cervical motion tenderness   Uterus- Normal sized, non-tender, no masses   Adnexa-  Non-tender, no masses  Extremities: Non-tender, full range of motion, no clubbing, cyanosis or edema  Skin:  General inspection- no rashes, lesions or discoloration  Pap smear done    Assessment/Plan:  Normal well-woman exam.  Yearly mammogram at age 41.  Pap smear obtained.    Declined chaperone for exam today     Diagnoses and all orders for this visit:    Well woman exam with routine gynecological exam  - self breast exam discussed and encouraged    Screening for cervical cancer  -     ThinPrep PAP Smear; Future    Screening for human papillomavirus (HPV)  -     Hpv Dna  High Risk , Thin Prep Collect; Future         Return in about 1 year (around 7/30/2025) for annual well woman exam or sooner if needed.

## 2024-07-31 LAB — HPV E6+E7 MRNA CVX QL NAA+PROBE: NEGATIVE

## 2024-08-03 LAB
.: NORMAL
.: NORMAL

## 2025-02-17 ENCOUNTER — OFFICE VISIT (OUTPATIENT)
Dept: FAMILY MEDICINE CLINIC | Facility: CLINIC | Age: 35
End: 2025-02-17
Payer: COMMERCIAL

## 2025-02-17 VITALS
RESPIRATION RATE: 18 BRPM | SYSTOLIC BLOOD PRESSURE: 118 MMHG | WEIGHT: 160 LBS | HEART RATE: 88 BPM | DIASTOLIC BLOOD PRESSURE: 66 MMHG | HEIGHT: 63 IN | OXYGEN SATURATION: 98 % | TEMPERATURE: 98 F | BODY MASS INDEX: 28.35 KG/M2

## 2025-02-17 DIAGNOSIS — Z86.19 HISTORY OF RSV INFECTION: ICD-10-CM

## 2025-02-17 DIAGNOSIS — R09.81 CHRONIC NASAL CONGESTION: ICD-10-CM

## 2025-02-17 DIAGNOSIS — E66.3 OVERWEIGHT WITH BODY MASS INDEX (BMI) OF 28 TO 28.9 IN ADULT: ICD-10-CM

## 2025-02-17 DIAGNOSIS — Z00.00 ROUTINE GENERAL MEDICAL EXAMINATION AT A HEALTH CARE FACILITY: Primary | ICD-10-CM

## 2025-02-17 NOTE — PATIENT INSTRUCTIONS
-Recommend until you get into see the ENT take over-the-counter Zyrtec or Claritin nightly.

## 2025-02-17 NOTE — PROGRESS NOTES
Chief Complaint   Patient presents with    Wellness Visit    Other     Chronic nasal congestion         HPI:   Leticia Ann is a 34 year old female       Nasal congestion:  Constant.  Duration is months to years.  Some months worse than others.  Has taken benadryl.  Has used otc steroid nasal spray in the past that caused her nose to bleed.      Had RSV in January.      Symptoms: denies discharge, itching, burning or dysuria, periods are regular.     Last PAP: UTD  Abnormal PAP: denies h/o abnl pap          Wt Readings from Last 6 Encounters:   02/17/25 160 lb (72.6 kg)   07/30/24 155 lb 6 oz (70.5 kg)   05/30/24 153 lb (69.4 kg)   05/27/24 150 lb (68 kg)   05/27/24 150 lb (68 kg)   05/24/24 150 lb (68 kg)     Body mass index is 28.34 kg/m².       Patient Active Problem List   Diagnosis    Multiple drug allergies    Hypercholesterolemia    Family history of premature coronary artery disease    Chronic nasal congestion    History of RSV infection     No current outpatient medications on file.      Past Medical History:    History of RSV infection    January 2025      Visual impairment    contact lenses and glasses      History reviewed. No pertinent surgical history.   Family History   Problem Relation Age of Onset    Hypertension Father     Lipids Father     Heart Disorder Father     No Known Problems Mother     Cancer Maternal Grandmother     No Known Problems Maternal Grandfather     Heart Disorder Paternal Grandmother     Hypertension Paternal Grandmother     No Known Problems Paternal Grandfather     Cancer Paternal Aunt       Social History:   Social History     Socioeconomic History    Marital status:    Tobacco Use    Smoking status: Never    Smokeless tobacco: Never   Vaping Use    Vaping status: Never Used   Substance and Sexual Activity    Alcohol use: Yes     Alcohol/week: 3.0 standard drinks of alcohol     Types: 1 Glasses of wine, 2 Standard drinks or equivalent per week     Comment:  socially    Drug use: Never    Sexual activity: Yes     Partners: Male   Other Topics Concern    Caffeine Concern Yes     Comment: 1-3 cups weekly    Exercise No    Seat Belt Yes     Social Drivers of Health     Food Insecurity: No Food Insecurity (2/17/2025)    NCSS - Food Insecurity     Worried About Running Out of Food in the Last Year: No     Ran Out of Food in the Last Year: No   Transportation Needs: No Transportation Needs (2/17/2025)    NCSS - Transportation     Lack of Transportation: No   Stress: No Stress Concern Present (3/4/2023)    Stress     Feeling of Stress : No   Housing Stability: Not At Risk (2/17/2025)    NCSS - Housing/Utilities     Has Housing: Yes     Worried About Losing Housing: No     Unable to Get Utilities: No     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 Marital Status: . Children: 2.   Exercise: none.    Diet: tries to eat in moderation.     REVIEW OF SYSTEMS:   GENERAL: Overall feels well  SKIN: denies any unusual skin lesions or rashes  EYES: denies vision changes  HEENT: denies upper respiratory symptoms  LUNGS: denies cough or shortness of breath with exertion  CHEST:  denies breast changes or pain  CARDIOVASCULAR: denies chest pain or tightness on exertion  VASCULAR: No lower extremity swelling  GI: denies abdominal pain, bowel movement changes, blood in stool  : No complaint of urinary problems, vaginal discharge or discomfort  MUSCULOSKELETAL: denies joint pain   NEURO: denies headaches or dizziness  PSYCH: denies depression or anxiety  ENDOCRINE: No complaints of temperature intolerance, polyuria, or excessive sweating.  LYMPHATICS: No complaints of swollen glands      EXAM:   /66 (BP Location: Left arm, Patient Position: Sitting, Cuff Size: adult)   Pulse 88   Temp 97.7 °F (36.5 °C) (Temporal)   Resp 18   Ht 5' 3\" (1.6 m)   Wt 160 lb (72.6 kg)   LMP 07/07/2024 (Exact Date)   SpO2 98%   BMI 28.34 kg/m²   Body mass index is 28.34 kg/m².   GENERAL: NAD, Pleasant   female female.  SKIN: No visible rashes or suspicious lesions.  HEENT: atraumatic, normocephalic, EACs and TMs clear normal bilaterally.  Nose: Bilateral turbinates boggy right greater than left, nasal mucosa is very light pink.  No nasal discharge.  OP: MMM.  Posteriorly no exudate or erythema.  EYES: PERRL, EOMI, sclera, conjunctiva are clear  NECK: supple, no adenopathy/thyromegaly/masses  LUNGS: Clear to auscultation bilateral, no rales, rhonchi or wheezing  CARDIO: RRR without murmur normal S1S2  ABD:  Soft, non tender to palpation.  No masses, HSM, or pulsations appreciated.  MUSCULOSKELETAL: gait normal, no gross M/S defect.  EXTREMITIES: no clubbing, cyanosis, or edema  NEURO: Alert and oriented x3.  No gross motor or sensory deficit.  PSYCH: normal affect      Immunization History   Administered Date(s) Administered    MMR 10/31/2020    TDAP 08/07/2020, 12/15/2022   Pended Date(s) Pended    Influenza Vaccine Refused 02/15/2024       DATA:    Cholesterol:     Lab Results   Component Value Date    CHOLEST 195 05/25/2024    CHOLEST 242 (H) 02/24/2024    CHOLEST 194 06/15/2019     Lab Results   Component Value Date    HDL 49 05/25/2024    HDL 58 02/24/2024    HDL 47 06/15/2019     Lab Results   Component Value Date    TRIG 91 05/25/2024    TRIG 48 02/24/2024    TRIG 74 06/15/2019     Lab Results   Component Value Date     (H) 05/25/2024     (H) 02/24/2024     (H) 06/15/2019     Lab Results   Component Value Date    AST 7 (L) 02/24/2024    AST 18 06/15/2019     Lab Results   Component Value Date    ALT 17 02/24/2024    ALT 25 06/15/2019           ASSESSMENT AND PLAN:   Leticia Ann is a 34 year old female who presents for a complete physical exam.        Encounter Diagnoses   Name Primary?    Routine general medical examination at a health care facility Yes    Chronic nasal congestion     History of RSV infection     Overweight with body mass index (BMI) of 28 to 28.9 in  adult              1. Routine general medical examination at a health care facility  Patient provided handout on women's health and prevention.   Routine health profile labs pending.  Recommend age-appropriate calcium and vitamin D.    - CBC With Differential With Platelet; Future  - Comp Metabolic Panel (14); Future  - Lipid Panel; Future  - Assay, Thyroid Stim Hormone; Future  - Free T4, (Free Thyroxine); Future    2. Chronic nasal congestion  Patient reports epistaxis when using nasal spray in the past, likely corticosteroid nasal spray.  Recommend trial of nightly Claritin or Zyrtec.  Environmental allergy RAST panel ordered and pending.  Patient referred to ENT, Dr. Coyne, for further evaluation and care.    -Recommend until you get into see the ENT take over-the-counter Zyrtec or Claritin nightly.    - Allergens, Zone 8 [E]; Future  - ENT Referral - In Network    3. History of RSV infection  January 2025.    4. Overweight with body mass index (BMI) of 28 to 28.9 in adult  Recommend healthy diet including green leafy vegetables, fresh fruits and lean protein.  Aerobic exercise for total of 150 minutes/week is recommended for cardiovascular fitness, and 45-60 minutes 6-7 days a week to help obtain weight loss.           Orders Placed This Encounter   Procedures    Allergens, Zone 8 [E]    CBC With Differential With Platelet    Comp Metabolic Panel (14)    Lipid Panel    Assay, Thyroid Stim Hormone    Free T4, (Free Thyroxine)       Meds & Refills for this Visit:  Requested Prescriptions      No prescriptions requested or ordered in this encounter       Imaging & Consults:  ENT - INTERNAL  Health Maintenance Due   Topic Date Due    COVID-19 Vaccine (1 - 2024-25 season) Never done    Influenza Vaccine (1) Never done    Annual Depression Screening  01/01/2025    Annual Physical  02/15/2025     Return in about 1 year (around 2/17/2026) for Annual wellness visit.  Sooner pending blood test results.

## 2025-02-22 ENCOUNTER — LAB ENCOUNTER (OUTPATIENT)
Dept: LAB | Age: 35
End: 2025-02-22
Attending: FAMILY MEDICINE
Payer: COMMERCIAL

## 2025-02-22 DIAGNOSIS — R09.81 CHRONIC NASAL CONGESTION: ICD-10-CM

## 2025-02-22 DIAGNOSIS — Z00.00 ROUTINE GENERAL MEDICAL EXAMINATION AT A HEALTH CARE FACILITY: ICD-10-CM

## 2025-02-22 LAB
ALBUMIN SERPL-MCNC: 4.5 G/DL (ref 3.2–4.8)
ALBUMIN/GLOB SERPL: 1.5 {RATIO} (ref 1–2)
ALP LIVER SERPL-CCNC: 49 U/L
ALT SERPL-CCNC: 11 U/L
ANION GAP SERPL CALC-SCNC: 4 MMOL/L (ref 0–18)
AST SERPL-CCNC: 16 U/L (ref ?–34)
BASOPHILS # BLD AUTO: 0.06 X10(3) UL (ref 0–0.2)
BASOPHILS NFR BLD AUTO: 0.9 %
BILIRUB SERPL-MCNC: 0.9 MG/DL (ref 0.3–1.2)
BUN BLD-MCNC: 12 MG/DL (ref 9–23)
CALCIUM BLD-MCNC: 9.4 MG/DL (ref 8.7–10.6)
CHLORIDE SERPL-SCNC: 106 MMOL/L (ref 98–112)
CHOLEST SERPL-MCNC: 231 MG/DL (ref ?–200)
CO2 SERPL-SCNC: 29 MMOL/L (ref 21–32)
CREAT BLD-MCNC: 0.75 MG/DL
EGFRCR SERPLBLD CKD-EPI 2021: 108 ML/MIN/1.73M2 (ref 60–?)
EOSINOPHIL # BLD AUTO: 0.41 X10(3) UL (ref 0–0.7)
EOSINOPHIL NFR BLD AUTO: 6.3 %
ERYTHROCYTE [DISTWIDTH] IN BLOOD BY AUTOMATED COUNT: 13 %
FASTING PATIENT LIPID ANSWER: YES
FASTING STATUS PATIENT QL REPORTED: YES
GLOBULIN PLAS-MCNC: 3.1 G/DL (ref 2–3.5)
GLUCOSE BLD-MCNC: 89 MG/DL (ref 70–99)
HCT VFR BLD AUTO: 38.5 %
HDLC SERPL-MCNC: 50 MG/DL (ref 40–59)
HGB BLD-MCNC: 12.5 G/DL
IMM GRANULOCYTES # BLD AUTO: 0.03 X10(3) UL (ref 0–1)
IMM GRANULOCYTES NFR BLD: 0.5 %
LDLC SERPL CALC-MCNC: 169 MG/DL (ref ?–100)
LYMPHOCYTES # BLD AUTO: 1.84 X10(3) UL (ref 1–4)
LYMPHOCYTES NFR BLD AUTO: 28.5 %
MCH RBC QN AUTO: 29.6 PG (ref 26–34)
MCHC RBC AUTO-ENTMCNC: 32.5 G/DL (ref 31–37)
MCV RBC AUTO: 91 FL
MONOCYTES # BLD AUTO: 0.53 X10(3) UL (ref 0.1–1)
MONOCYTES NFR BLD AUTO: 8.2 %
NEUTROPHILS # BLD AUTO: 3.59 X10 (3) UL (ref 1.5–7.7)
NEUTROPHILS # BLD AUTO: 3.59 X10(3) UL (ref 1.5–7.7)
NEUTROPHILS NFR BLD AUTO: 55.6 %
NONHDLC SERPL-MCNC: 181 MG/DL (ref ?–130)
OSMOLALITY SERPL CALC.SUM OF ELEC: 287 MOSM/KG (ref 275–295)
PLATELET # BLD AUTO: 267 10(3)UL (ref 150–450)
POTASSIUM SERPL-SCNC: 4.1 MMOL/L (ref 3.5–5.1)
PROT SERPL-MCNC: 7.6 G/DL (ref 5.7–8.2)
RBC # BLD AUTO: 4.23 X10(6)UL
SODIUM SERPL-SCNC: 139 MMOL/L (ref 136–145)
T4 FREE SERPL-MCNC: 0.9 NG/DL (ref 0.8–1.7)
TRIGL SERPL-MCNC: 69 MG/DL (ref 30–149)
TSI SER-ACNC: 2.48 UIU/ML (ref 0.55–4.78)
VLDLC SERPL CALC-MCNC: 14 MG/DL (ref 0–30)
WBC # BLD AUTO: 6.5 X10(3) UL (ref 4–11)

## 2025-02-22 PROCEDURE — 86003 ALLG SPEC IGE CRUDE XTRC EA: CPT

## 2025-02-22 PROCEDURE — 80061 LIPID PANEL: CPT

## 2025-02-22 PROCEDURE — 80053 COMPREHEN METABOLIC PANEL: CPT

## 2025-02-22 PROCEDURE — 36415 COLL VENOUS BLD VENIPUNCTURE: CPT

## 2025-02-22 PROCEDURE — 84443 ASSAY THYROID STIM HORMONE: CPT

## 2025-02-22 PROCEDURE — 82785 ASSAY OF IGE: CPT

## 2025-02-22 PROCEDURE — 84439 ASSAY OF FREE THYROXINE: CPT

## 2025-02-22 PROCEDURE — 85025 COMPLETE CBC W/AUTO DIFF WBC: CPT

## 2025-02-25 LAB
A ALTERNATA IGE QN: <0.1 KUA/L (ref ?–0.1)
A FUMIGATUS IGE QN: <0.1 KUA/L (ref ?–0.1)
AMER SYCAMORE IGE QN: <0.1 KUA/L (ref ?–0.1)
BERMUDA GRASS IGE QN: <0.1 KUA/L (ref ?–0.1)
BOXELDER IGE QN: <0.1 KUA/L (ref ?–0.1)
C HERBARUM IGE QN: <0.1 KUA/L (ref ?–0.1)
CALIF WALNUT IGE QN: <0.1 KUA/L (ref ?–0.1)
CAT DANDER IGE QN: 0.72 KUA/L (ref ?–0.1)
CMN PIGWEED IGE QN: <0.1 KUA/L (ref ?–0.1)
COMMON RAGWEED IGE QN: <0.1 KUA/L (ref ?–0.1)
COTTONWOOD IGE QN: <0.1 KUA/L (ref ?–0.1)
D FARINAE IGE QN: 44.3 KUA/L (ref ?–0.1)
D PTERONYSS IGE QN: 68.9 KUA/L (ref ?–0.1)
DOG DANDER IGE QN: 0.27 KUA/L (ref ?–0.1)
IGE SERPL-ACNC: 152 KU/L (ref 2–214)
M RACEMOSUS IGE QN: <0.1 KUA/L (ref ?–0.1)
MARSH ELDER IGE QN: <0.1 KUA/L (ref ?–0.1)
MOUSE EPITH IGE QN: <0.1 KUA/L (ref ?–0.1)
MT JUNIPER IGE QN: <0.1 KUA/L (ref ?–0.1)
P NOTATUM IGE QN: <0.1 KUA/L (ref ?–0.1)
PECAN/HICK TREE IGE QN: <0.1 KUA/L (ref ?–0.1)
ROACH IGE QN: <0.1 KUA/L (ref ?–0.1)
SALTWORT IGE QN: <0.1 KUA/L (ref ?–0.1)
SILVER BIRCH IGE QN: <0.1 KUA/L (ref ?–0.1)
TIMOTHY IGE QN: <0.1 KUA/L (ref ?–0.1)
WHITE ASH IGE QN: <0.1 KUA/L (ref ?–0.1)
WHITE ELM IGE QN: <0.1 KUA/L (ref ?–0.1)
WHITE MULBERRY IGE QN: <0.1 KUA/L (ref ?–0.1)
WHITE OAK IGE QN: <0.1 KUA/L (ref ?–0.1)

## 2025-04-07 ENCOUNTER — OFFICE VISIT (OUTPATIENT)
Dept: FAMILY MEDICINE CLINIC | Facility: CLINIC | Age: 35
End: 2025-04-07
Payer: COMMERCIAL

## 2025-04-07 VITALS
BODY MASS INDEX: 27.46 KG/M2 | TEMPERATURE: 98 F | DIASTOLIC BLOOD PRESSURE: 66 MMHG | WEIGHT: 155 LBS | HEART RATE: 61 BPM | OXYGEN SATURATION: 96 % | RESPIRATION RATE: 14 BRPM | SYSTOLIC BLOOD PRESSURE: 100 MMHG | HEIGHT: 63 IN

## 2025-04-07 DIAGNOSIS — Z82.49 FAMILY HISTORY OF PREMATURE CORONARY ARTERY DISEASE: ICD-10-CM

## 2025-04-07 DIAGNOSIS — J30.81 ALLERGY TO CATS: ICD-10-CM

## 2025-04-07 DIAGNOSIS — J30.89 ALLERGIC RHINITIS DUE TO DUST MITE: ICD-10-CM

## 2025-04-07 DIAGNOSIS — E78.00 HYPERCHOLESTEROLEMIA: Primary | ICD-10-CM

## 2025-04-07 PROCEDURE — 99214 OFFICE O/P EST MOD 30 MIN: CPT | Performed by: FAMILY MEDICINE

## 2025-04-07 NOTE — PATIENT INSTRUCTIONS
-Recommend use pillowcase and mattress for allergies, Allerease is one brand.    -Ok to take otc Zyrtec or Claritin as needed for allergic rhinitis.

## 2025-04-07 NOTE — PROGRESS NOTES
Leticia Ann is a 34 year old female.     Chief Complaint   Patient presents with    Follow - Up     Discuss blood work results    Hyperlipidemia         HPI:       Hypercholesterolemia:  Per patient there is a cyclical variation to her LDL.  LDL worsens in the winter as she does not exercise and in the summer she exercises.  Feels that when she exercises is when her LDL goes down.    Family history of heart disease, father had CABG x3 in his mid 50s, paternal grandmother had heart disease CABG x3.  Father had coronary AA stent 42 yo.  Paternal uncle had CABG in his 60s.      Allergic rhinitis:  Patient completed environmental allergy RAST panel.  She is allergic to dust mites, cats, and lesser dogs.  Patient states it is noticeable she is more allergic to cats.        Wt Readings from Last 6 Encounters:   04/07/25 155 lb (70.3 kg)   02/17/25 160 lb (72.6 kg)   07/30/24 155 lb 6 oz (70.5 kg)   05/30/24 153 lb (69.4 kg)   05/27/24 150 lb (68 kg)   05/27/24 150 lb (68 kg)      Body mass index is 27.46 kg/m².        No current outpatient medications on file.      Past Medical History:    History of RSV infection    January 2025      Visual impairment    contact lenses and glasses      History reviewed. No pertinent surgical history.   Social History:    Social History     Socioeconomic History    Marital status:    Tobacco Use    Smoking status: Never    Smokeless tobacco: Never   Vaping Use    Vaping status: Never Used   Substance and Sexual Activity    Alcohol use: Yes     Alcohol/week: 3.0 standard drinks of alcohol     Types: 1 Glasses of wine, 2 Standard drinks or equivalent per week     Comment: socially    Drug use: Never    Sexual activity: Yes     Partners: Male   Other Topics Concern    Caffeine Concern Yes     Comment: 1-3 cups weekly    Exercise No    Seat Belt Yes     Social Drivers of Health     Food Insecurity: No Food Insecurity (2/17/2025)    NCSS - Food Insecurity     Worried About  Running Out of Food in the Last Year: No     Ran Out of Food in the Last Year: No   Transportation Needs: No Transportation Needs (2/17/2025)    NCSS - Transportation     Lack of Transportation: No   Stress: No Stress Concern Present (3/4/2023)    Stress     Feeling of Stress : No   Housing Stability: Not At Risk (2/17/2025)    NCSS - Housing/Utilities     Has Housing: Yes     Worried About Losing Housing: No     Unable to Get Utilities: No         Family History   Problem Relation Age of Onset    Hypertension Father     Lipids Father     Heart Disorder Father     No Known Problems Mother     Cancer Maternal Grandmother     No Known Problems Maternal Grandfather     Heart Disorder Paternal Grandmother     Hypertension Paternal Grandmother     No Known Problems Paternal Grandfather     Cancer Paternal Aunt      REVIEW OF SYSTEMS:   GENERAL HEALTH: Overall feels well.    INTEGUMENT: denies any unusual skin lesions or rashes   RESPIRATORY: Denies: NO/CERON/Cough  CARDIOVASCULAR: Denies CP/palpitations  VASCULAR: Denies LE edema  GI: Denies abdominal pain/nausea/vomiting/blood in stool/black stool/bloating/constipation/diarrhea  : denies urinary symptoms  NEURO: No complaints of headaches/dizziness  PSYCH: denies depression and anxiety    Immunization History   Administered Date(s) Administered    MMR 10/31/2020    TDAP 08/07/2020, 12/15/2022   Pended Date(s) Pended    Influenza Vaccine Refused 02/15/2024       EXAM:   /66   Pulse 61   Temp 97.7 °F (36.5 °C) (Temporal)   Resp 14   Ht 5' 3\" (1.6 m)   Wt 155 lb (70.3 kg)   LMP 07/07/2024 (Exact Date)   SpO2 96%   BMI 27.46 kg/m²   GENERAL: NAD, pleasant well-appearing  female  INTEGUMENT: no visible rashes  HEAD: NCAT  LUNGS: CTA A/P no wheezes/ronchi/rales/crackles  CARDIO: RRR, +S1/S2, no mm  VASCULAR: No lower extremity edema  EXTREMITIES: no cyanosis or clubbing  NEURO: Alert and Oriented x3.  No gross motor or gross sensory  abnormalities.  PSYCH: Affect normal.  Normal thought content.        DATA:    Component      Latest Ref Rng 6/15/2019 2/24/2024 5/25/2024 2/22/2025   Cholesterol, Total      <200 mg/dL 194  242 (H)  195  231 (H)    HDL Cholesterol      40 - 59 mg/dL 47  58  49  50    Triglycerides      30 - 149 mg/dL 74  48  91  69    LDL Cholesterol Calc      <100 mg/dL 132 (H)  177 (H)  129 (H)  169 (H)    VLDL      0 - 30 mg/dL 15  10  16  14    NON-HDL CHOLESTEROL      <130 mg/dL 147 (H)  184 (H)  146 (H)  181 (H)    Patient Fasting for Lipid?  No  Yes  Yes         Component      Latest Ref Rn 2/22/2025   ALLERGEN A. ALTERNATA (S)      <0.10 kUA/L <0.10    ASPERGILLUS FUMIGATUS      <0.10 kUA/L <0.10    ALLERGEN BERMUDA GRASS (S)      <0.10 kUA/L <0.10    ALLERGEN BIRCH TREE (S)      <0.10 kUA/L <0.10    ALLERGEN BOX ELDER (S)      <0.10 kUA/L <0.10    ALLERGEN C. HERBARUM (S)      <0.10 kUA/L <0.10    ALLERGEN CAT DANDER (S)      <0.10 kUA/L 0.72 (H)    ALLERGEN COCKROACH (S)      <0.10 kUA/L <0.10    ALLERGEN COMMON PIGWEED (S)      <0.10 kUA/L <0.10    ALLERGEN COMMON RAGWEED (S)      <0.10 kUA/L <0.10    ALLERGEN COTTONWOOD TREE (S)      <0.10 kUA/L <0.10    ALLERGEN D. FARINAE (S)      <0.10 kUA/L 44.30 (H)    ALLERGEN D.PTERONYSSINUS (S)      <0.10 kUA/L 68.90 (H)    ALLERGEN DOG DANDER (S)      <0.10 kUA/L 0.27 (H)    ALLERGEN ELM TREE (S)      <0.10 kUA/L <0.10    ALLERGEN MARSH ELDER (S)      <0.10 kUA/L <0.10    ALLERGEN MOUSE EPITHELIUM (S)      <0.10 kUA/L <0.10    ALLERGEN MTN CEDAR (S)      <0.10 kUA/L <0.10    ALLERGEN MUCOR RACEMOSUS (S)      <0.10 kUA/L <0.10    ALLERGEN MULBERRY (S)      <0.10 kUA/L <0.10    ALLERGEN OAK TREE (S)      <0.10 kUA/L <0.10    ALLERGEN PECAN/HICKORY (S)      <0.10 kUA/L <0.10    ALLERGEN PENICILLIUM NOTATUM      <0.10 kUA/L <0.10    ALLERGEN Taiwanese THISTLE (S)      <0.10 kUA/L <0.10    ALLERGEN SYCAMORE (S)      <0.10 kUA/L <0.10    ALLERGEN YAMILETH GRASS (S)      <0.10 kUA/L  <0.10    ALLERGEN WALNUT TREE (S)      <0.10 kUA/L <0.10    ALLERGEN WHITE KYMBERLY (S)      <0.10 kUA/L <0.10    IMMUNOGLOBULIN E      2.00 - 214.00 kU/L 152.00           ASSESSMENT AND PLAN:       Encounter Diagnoses   Name Primary?    Hypercholesterolemia Yes    Family history of premature coronary artery disease     Allergic rhinitis due to dust mite     Allergy to cats        1. Hypercholesterolemia  2. Family history of premature coronary artery disease  Hypercholesterolemia in the setting of family history of premature coronary artery disease.  Family history of heart disease, father had CABG x3 in his mid 50s, paternal grandmother had heart disease CABG x3.  Father had coronary AA stent 42 yo.  Paternal uncle had CABG in his 60s.  Recommend reevaluate lipid panel in 3 months then follow-up office visit, consider starting statin in the near future.  I discussed with patient is important to maintain healthy habits of exercising regular basis and healthy diet year-round and to make a plan for exercise for in the fall and winter.    - Lipid Panel; Future    3. Allergic rhinitis due to dust mite  4. Allergy to cats  2/22/2025 environmental allergy RAST panel.  Recommend use allergen barriers for pillows and mattress.  Avoid known triggers as able.  Okay to take OTC antihistamines as needed.  Consider montelukast in the near future.          Orders Placed This Encounter   Procedures    Lipid Panel         Return in about 3 months (around 7/7/2025) for Hypercholesterolemia after recheck of lipid panel.

## 2025-04-08 PROBLEM — J30.89 ALLERGIC RHINITIS DUE TO DUST MITE: Status: ACTIVE | Noted: 2025-04-08

## 2025-04-08 PROBLEM — J30.81 ALLERGY TO CATS: Status: ACTIVE | Noted: 2025-04-08

## 2025-06-25 ENCOUNTER — LAB ENCOUNTER (OUTPATIENT)
Dept: LAB | Age: 35
End: 2025-06-25
Attending: FAMILY MEDICINE
Payer: COMMERCIAL

## 2025-06-25 DIAGNOSIS — E78.00 HYPERCHOLESTEROLEMIA: ICD-10-CM

## 2025-06-25 DIAGNOSIS — Z82.49 FAMILY HISTORY OF PREMATURE CORONARY ARTERY DISEASE: ICD-10-CM

## 2025-06-25 LAB
CHOLEST SERPL-MCNC: 230 MG/DL (ref ?–200)
FASTING PATIENT LIPID ANSWER: YES
HDLC SERPL-MCNC: 53 MG/DL (ref 40–59)
LDLC SERPL CALC-MCNC: 166 MG/DL (ref ?–100)
NONHDLC SERPL-MCNC: 177 MG/DL (ref ?–130)
TRIGL SERPL-MCNC: 64 MG/DL (ref 30–149)
VLDLC SERPL CALC-MCNC: 13 MG/DL (ref 0–30)

## 2025-06-25 PROCEDURE — 80061 LIPID PANEL: CPT

## 2025-06-25 PROCEDURE — 36415 COLL VENOUS BLD VENIPUNCTURE: CPT

## 2025-06-26 ENCOUNTER — TELEPHONE (OUTPATIENT)
Dept: FAMILY MEDICINE CLINIC | Facility: CLINIC | Age: 35
End: 2025-06-26

## 2025-06-26 ENCOUNTER — OFFICE VISIT (OUTPATIENT)
Dept: FAMILY MEDICINE CLINIC | Facility: CLINIC | Age: 35
End: 2025-06-26
Payer: COMMERCIAL

## 2025-06-26 VITALS
OXYGEN SATURATION: 97 % | TEMPERATURE: 98 F | WEIGHT: 154 LBS | BODY MASS INDEX: 27 KG/M2 | HEART RATE: 85 BPM | SYSTOLIC BLOOD PRESSURE: 100 MMHG | RESPIRATION RATE: 18 BRPM | DIASTOLIC BLOOD PRESSURE: 62 MMHG

## 2025-06-26 DIAGNOSIS — E78.00 HYPERCHOLESTEROLEMIA: Primary | ICD-10-CM

## 2025-06-26 DIAGNOSIS — Z76.89 ENCOUNTER FOR NEW MEDICATION PRESCRIPTION: ICD-10-CM

## 2025-06-26 DIAGNOSIS — Z82.49 FAMILY HISTORY OF CORONARY ARTERY DISEASE: ICD-10-CM

## 2025-06-26 PROCEDURE — 99214 OFFICE O/P EST MOD 30 MIN: CPT | Performed by: FAMILY MEDICINE

## 2025-06-26 RX ORDER — ROSUVASTATIN CALCIUM 5 MG/1
5 TABLET, COATED ORAL NIGHTLY
Qty: 30 TABLET | Refills: 2 | Status: SHIPPED | OUTPATIENT
Start: 2025-06-26

## 2025-06-26 NOTE — PROGRESS NOTES
Leticia Ann is a 34 year old female.     Chief Complaint   Patient presents with    Lab Results     6/26    Hyperlipidemia         HPI:       Hypercholesterolemia:  Maternal grandfather had fatal MI in his 60s, he also used alcohol a great deal.  Father, paternal uncle, and paternal grandmother have all had CABG.  Father was 56 yo approx and was she thinks CABG x3, also has HTN.  Uncle was around late 60s.  Grandmother was in her 60s also she estimates.        Wt Readings from Last 6 Encounters:   06/26/25 154 lb (69.9 kg)   04/07/25 155 lb (70.3 kg)   02/17/25 160 lb (72.6 kg)   07/30/24 155 lb 6 oz (70.5 kg)   05/30/24 153 lb (69.4 kg)   05/27/24 150 lb (68 kg)      Body mass index is 27.28 kg/m².        Current Medications[1]   Past Medical History[2]   Past Surgical History[3]   Social History:    Short Social Hx on File[4]    Family History[5]  REVIEW OF SYSTEMS:   GENERAL HEALTH: Overall feels well.   INTEGUMENT: No complaints of any unusual skin lesions or rashes   RESPIRATORY: Denies: NO/CERON  CARDIOVASCULAR: Denies CP/palpitations  VASCULAR: Denies LE edema  NEURO: denies headaches/dizziness  PSYCH: denies depression and anxiety    Immunization History   Administered Date(s) Administered    MMR 10/31/2020    TDAP 08/07/2020, 12/15/2022   Pended Date(s) Pended    Influenza Vaccine Refused 02/15/2024       EXAM:   /62   Pulse 85   Temp 98 °F (36.7 °C) (Temporal)   Resp 18   Wt 154 lb (69.9 kg)   LMP 06/18/2025 (Exact Date)   SpO2 97%   BMI 27.28 kg/m²   GENERAL: NAD, pleasant well-appearing  female  INTEGUMENT: no visible rashes  HEAD: NCAT  LUNGS: CTA A/P no wheezes/ronchi/rales/crackles  CARDIO: RRR, +S1/S2, no mm  VASCULAR: No lower extremity edema  EXTREMITIES: no cyanosis or clubbing  NEURO: Alert and Oriented x3.  PSYCH: Affect normal.  Normal thought content.        DATA:    Cholesterol:     Lab Results   Component Value Date    CHOLEST 230 (H) 06/25/2025    CHOLEST 231  (H) 02/22/2025    CHOLEST 195 05/25/2024     Lab Results   Component Value Date    HDL 53 06/25/2025    HDL 50 02/22/2025    HDL 49 05/25/2024     Lab Results   Component Value Date    TRIG 64 06/25/2025    TRIG 69 02/22/2025    TRIG 91 05/25/2024     Lab Results   Component Value Date     (H) 06/25/2025     (H) 02/22/2025     (H) 05/25/2024     Lab Results   Component Value Date    AST 16 02/22/2025    AST 7 (L) 02/24/2024    AST 18 06/15/2019     Lab Results   Component Value Date    ALT 11 02/22/2025    ALT 17 02/24/2024    ALT 25 06/15/2019           ASSESSMENT AND PLAN:       Encounter Diagnoses   Name Primary?    Hypercholesterolemia Yes    Encounter for new medication prescription     Family history of coronary artery disease          1. Hypercholesterolemia  Patient states as of July 1 that she will no longer have health insurance so she is uncertain if she will start the rosuvastatin yet or not.  Recommend start rosuvastatin 5 mg nightly if able.  Recommend work on therapeutic lifestyle changes.  Evaluate LFTs after taking the rosuvastatin for 6 weeks.  Reevaluate lipid panel in 3 months, then follow-up office visit.  Patient provided printed copies of her labs and I also filled out an order form for patient to do labs at future diagnostics which may be more cost effective for patient until she has insurance again.    - rosuvastatin 5 MG Oral Tab; Take 1 tablet (5 mg total) by mouth nightly.  Dispense: 30 tablet; Refill: 2  - Hepatic Function Panel (7) [E]; Future  - Lipid Panel; Future    2. Encounter for new medication prescription  Medication use, risks, benefits, side effects and precautions discussed, patient verbalizes understanding. Questions encouraged and answered to patient's satisfaction.    - rosuvastatin 5 MG Oral Tab; Take 1 tablet (5 mg total) by mouth nightly.  Dispense: 30 tablet; Refill: 2  - Hepatic Function Panel (7) [E]; Future    3. Family history of coronary  artery disease  Father, paternal uncle, and paternal grandmother have all had CABG.  Father was 54 yo approx and was she thinks CABG x3, also has HTN.  Uncle was around late 60s.  Grandmother was in her 60s, she estimates.          Orders Placed This Encounter   Procedures    Hepatic Function Panel (7) [E]    Lipid Panel       Meds & Refills for this Visit:  Requested Prescriptions     Signed Prescriptions Disp Refills    rosuvastatin 5 MG Oral Tab 30 tablet 2     Sig: Take 1 tablet (5 mg total) by mouth nightly.       Return in about 3 months (around 9/26/2025) for Hypercholesterolemia after recheck of lipid panel.           [1]   Current Outpatient Medications   Medication Sig Dispense Refill    rosuvastatin 5 MG Oral Tab Take 1 tablet (5 mg total) by mouth nightly. 30 tablet 2   [2]   Past Medical History:   History of RSV infection    January 2025      Visual impairment    contact lenses and glasses   [3] History reviewed. No pertinent surgical history.  [4]   Social History  Socioeconomic History    Marital status:    Tobacco Use    Smoking status: Never    Smokeless tobacco: Never   Vaping Use    Vaping status: Never Used   Substance and Sexual Activity    Alcohol use: Yes     Alcohol/week: 3.0 standard drinks of alcohol     Types: 1 Glasses of wine, 2 Standard drinks or equivalent per week     Comment: socially    Drug use: Never    Sexual activity: Yes     Partners: Male   Other Topics Concern    Caffeine Concern Yes     Comment: 1-3 cups weekly    Exercise No    Seat Belt Yes     Social Drivers of Health     Food Insecurity: No Food Insecurity (2/17/2025)    NCSS - Food Insecurity     Worried About Running Out of Food in the Last Year: No     Ran Out of Food in the Last Year: No   Transportation Needs: No Transportation Needs (2/17/2025)    NCSS - Transportation     Lack of Transportation: No   Stress: No Stress Concern Present (3/4/2023)    Stress     Feeling of Stress : No   Housing Stability: Not  At Risk (2/17/2025)    NCSS - Housing/Utilities     Has Housing: Yes     Worried About Losing Housing: No     Unable to Get Utilities: No   [5]   Family History  Problem Relation Age of Onset    Hypertension Father     Lipids Father     Heart Disorder Father     No Known Problems Mother     Cancer Maternal Grandmother     No Known Problems Maternal Grandfather     Heart Disorder Paternal Grandmother     Hypertension Paternal Grandmother     No Known Problems Paternal Grandfather     Cancer Paternal Aunt

## 2025-06-26 NOTE — TELEPHONE ENCOUNTER
Patient was called and scheduled for an appointment today, 6/26/25 at 3:30 PM, with Dr. Hernandez due to a cancellation.

## 2025-06-26 NOTE — TELEPHONE ENCOUNTER
Patient called requesting appointment to review allergy test results.  She states her insurance coverage will end in 2 days and is requesting to be seen either today or tomorrow, or to complete a phone visit if an in-person appointment is not available.    Please review and advise.

## 2025-06-28 PROBLEM — H04.123 DRY EYE SYNDROME OF BILATERAL LACRIMAL GLANDS: Status: ACTIVE | Noted: 2025-04-21

## 2025-06-28 PROBLEM — Z82.49 FAMILY HISTORY OF CORONARY ARTERY DISEASE: Status: ACTIVE | Noted: 2025-06-28

## (undated) DEVICE — SCD SLEEVE KNEE HI BLEND

## (undated) DEVICE — STERILE POLYISOPRENE POWDER-FREE SURGICAL GLOVES: Brand: PROTEXIS

## (undated) DEVICE — NEEDLE SPINAL 22X3-1/2 BLK

## (undated) DEVICE — UNDYED BRAIDED (POLYGLACTIN 910), SYNTHETIC ABSORBABLE SUTURE: Brand: COATED VICRYL

## (undated) DEVICE — GYN CDS: Brand: MEDLINE INDUSTRIES, INC.

## (undated) DEVICE — CAUTERY PENCIL

## (undated) DEVICE — SOL  .9 1000ML BTL

## (undated) DEVICE — SUTURE PLAIN GUT 3-0 FS-2

## (undated) DEVICE — EXOFIN TISSUE ADHESIVE 1.0ML

## (undated) DEVICE — #15 STERILE STAINLESS BLADE: Brand: STERILE STAINLESS BLADES

## (undated) NOTE — LETTER
4/17/2023              Marcianomonalisa Flores [facility]        [address]        [place] [address]         To Whom It May Concern,  [facility] may return to work effective tomorrow with no restrictions. Please contact me if you have any questions.     Sincerely,    Genaro Simmons MD  [facility], [address], [address] [place] [address]  [phone]        Document electronically generated by:  Genaro Simmons MD

## (undated) NOTE — MR AVS SNAPSHOT
After Visit Summary   7/2/2021    Yamilet Underwood    MRN: [id number]           Visit Information     Date & Time  7/2/2021  2:00 PM Provider  [facility]s [facility] [address]  Phone  [phone] 7/2/2022) for Annual Gyn Visit. [facility] [place], [place] now offers Video Visits through [address] for adult and pediatric patients.   Video Visits are available Monday - Friday for many common conditions such as allergies, colds, cough, fever, rash, sore throat, Saturday – Sunday  10:00 am – 4:00 pm     [place]   Monday – Friday  4:00 pm – 10:00 pm   Saturday – Sunday  10:00 am – 4:00 pm  [facility]  Emergency Medicine Providers  Conditions needing urgent attention, but are   non-life-threatening.     Also av

## (undated) NOTE — LETTER
Jacey Haynes, :1990    CONSENT FOR PROCEDURE/SEDATION    1. I authorize the performance upon Jacey Haynes  the following: Vulvar Incision and Drainage    2.  I authorize Dr. Sonny Fairchild MD (and whomever is designated as the doctor’s lyle ____________________________________________    Witness: _________________________________________ Date:___________     Physician Signature: _______________________________ Date:___________

## (undated) NOTE — LETTER
Peggy Tellez, :1990    CONSENT FOR PROCEDURE/SEDATION    1. I authorize the performance upon Peggy Tellez  the following: incision and darinage    2.  I authorize Dr. Nikki Felty, MD (and whomever is designated as the doctor’s assistant), Witness: _________________________________________ Date:___________     Physician Signature: _______________________________ Date:___________